# Patient Record
Sex: FEMALE | Race: WHITE | Employment: FULL TIME | URBAN - NONMETROPOLITAN AREA
[De-identification: names, ages, dates, MRNs, and addresses within clinical notes are randomized per-mention and may not be internally consistent; named-entity substitution may affect disease eponyms.]

---

## 2022-04-11 ENCOUNTER — OFFICE VISIT (OUTPATIENT)
Dept: SURGERY | Age: 52
End: 2022-04-11
Payer: COMMERCIAL

## 2022-04-11 VITALS
WEIGHT: 220 LBS | BODY MASS INDEX: 29.8 KG/M2 | DIASTOLIC BLOOD PRESSURE: 82 MMHG | SYSTOLIC BLOOD PRESSURE: 134 MMHG | HEIGHT: 72 IN | TEMPERATURE: 98.3 F | HEART RATE: 79 BPM

## 2022-04-11 DIAGNOSIS — R92.8 ABNORMAL MAMMOGRAM: Primary | ICD-10-CM

## 2022-04-11 PROCEDURE — 99203 OFFICE O/P NEW LOW 30 MIN: CPT | Performed by: PHYSICIAN ASSISTANT

## 2022-04-14 ENCOUNTER — HOSPITAL ENCOUNTER (OUTPATIENT)
Dept: WOMENS IMAGING | Age: 52
Discharge: HOME | End: 2022-04-14
Payer: COMMERCIAL

## 2022-04-14 DIAGNOSIS — N64.89 BREAST ASYMMETRY: ICD-10-CM

## 2022-04-14 DIAGNOSIS — R92.8 ABNORMAL MAMMOGRAM: ICD-10-CM

## 2022-04-14 PROCEDURE — G0279 TOMOSYNTHESIS, MAMMO: HCPCS

## 2022-04-14 PROCEDURE — A4648 IMPLANTABLE TISSUE MARKER: HCPCS

## 2022-04-14 PROCEDURE — 88305 TISSUE EXAM BY PATHOLOGIST: CPT

## 2022-04-14 PROCEDURE — 88361 TUMOR IMMUNOHISTOCHEM/COMPUT: CPT

## 2022-04-15 ENCOUNTER — TELEPHONE (OUTPATIENT)
Dept: SURGERY | Age: 52
End: 2022-04-15

## 2022-04-15 DIAGNOSIS — C50.811 MALIGNANT NEOPLASM OF OVERLAPPING SITES OF RIGHT FEMALE BREAST, UNSPECIFIED ESTROGEN RECEPTOR STATUS (HCC): Primary | ICD-10-CM

## 2022-04-15 RX ORDER — BUSPIRONE HYDROCHLORIDE 5 MG/1
5 TABLET ORAL 3 TIMES DAILY
Qty: 90 TABLET | Refills: 0 | Status: SHIPPED | OUTPATIENT
Start: 2022-04-15 | End: 2022-05-18 | Stop reason: SDUPTHER

## 2022-04-15 NOTE — TELEPHONE ENCOUNTER
Patient left voicemail requesting a return call regarding questions she has for [facility].  She can be reached at [phone]. oj

## 2022-04-15 NOTE — PROGRESS NOTES
Spoke with patient and went over path results. Requested anxiety med be sent to [employer].

## 2022-04-18 PROBLEM — C50.811 MALIGNANT NEOPLASM OF OVERLAPPING SITES OF RIGHT FEMALE BREAST (HCC): Status: ACTIVE | Noted: 2022-04-18

## 2022-04-18 NOTE — TELEPHONE ENCOUNTER
Orders added. Please schedule and breast talk with Dr. Dominique Galeano. Please cancel follow up with me. See if Buspar that I sent in helped her. Thanks.

## 2022-04-18 NOTE — TELEPHONE ENCOUNTER
Patient was informed:    MRI scheduled on 4/25/2022 at 2:30 Pm. All instructions for test were given.   Breast US Scheduled before breast talk at [facility] on 4/27/2022 @ 4 PM. She has a Breast talk with Dr. Yolanda Rivera at 5 PM.

## 2022-04-19 ENCOUNTER — HOSPITAL ENCOUNTER (OUTPATIENT)
Dept: WOMENS IMAGING | Age: 52
Discharge: HOME | End: 2022-04-19
Payer: COMMERCIAL

## 2022-04-19 ENCOUNTER — TELEPHONE (OUTPATIENT)
Dept: OTHER | Age: 52
End: 2022-04-19

## 2022-04-19 DIAGNOSIS — N64.89 BREAST ASYMMETRY: ICD-10-CM

## 2022-04-19 PROCEDURE — 77065 DX MAMMO INCL CAD UNI: CPT

## 2022-04-19 NOTE — TELEPHONE ENCOUNTER
Reached out to patient via telephone call to offer Nurse Navigator services. We spoke at length about navigator services and I offered to mail a Breast Cancer Treatment Handbook to her and she agreed that would be good. I included a business card with contact information, [employer] flyer, [employer] information and a welcome letter. I encouraged her to reach out at anytime with questions or concerns. Appointment times, dates and location reviewed. My chart message sent. Will follow up after breast talk. No questions at this point.

## 2022-04-22 ENCOUNTER — TELEPHONE (OUTPATIENT)
Dept: OTHER | Age: 52
End: 2022-04-22

## 2022-04-22 NOTE — TELEPHONE ENCOUNTER
Pt asking how she can obtain a copy of her pathology report for her cancer policy. Emailed a copy to patient per her request.  Pt informed she may need a letter also but she will contact her company and see what their requirements are. Pt verbalized understanding.

## 2022-04-23 NOTE — PROGRESS NOTES
Subjective:      Patient ID: Evette Murry is a 45 y.o. female. HPI  Ms. Elaina Mortensen presents to establish care for an abnormal mammogram right breast.  The US was normal.  The mammogram demonstrated a \"density noted in CC view. Not identified in the MLO view. \"  The CC view was the only mammogram image sent to us with the [address]. She did not have any spot compression images. Evette Murry is a 45 y.o. female with the following history as recorded in [facility]:  Patient Active Problem List    Diagnosis Date Noted    Malignant neoplasm of overlapping sites of right female breast ([facility] [address] 04/18/2022     Current Outpatient Medications   Medication Sig Dispense Refill    busPIRone (BUSPAR) 5 MG tablet Take 1 tablet by mouth 3 times daily 90 tablet 0     No current facility-administered medications for this visit. Allergies: Monistat [miconazole]  History reviewed. No pertinent past medical history. Past Surgical History:   Procedure Laterality Date    BREAST BIOPSY Left 2008    Benign    US BREAST NEEDLE BIOPSY RIGHT Right 4/14/2022    US BREAST NEEDLE BIOPSY RIGHT 4/14/2022 [facility] [address]     Family History   Problem Relation Age of Onset    Prostate Cancer Father 72     Social History     Tobacco Use    Smoking status: Not on file    Smokeless tobacco: Not on file   Substance Use Topics    Alcohol use: Not on file       Review of Systems   All other systems reviewed and are negative. Objective:   Physical Exam  Constitutional:       Appearance: Normal appearance. Chest:   Breasts:      Right: Normal.      Left: Normal.       Skin:     General: Skin is warm and dry. Neurological:      General: No focal deficit present. Mental Status: She is alert and oriented to person, place, and time. Psychiatric:         Mood and Affect: Mood normal.         Behavior: Behavior normal.         Thought Content:  Thought content normal.         Judgment: Judgment normal.         Assessment:      Abnormal mammogram right breast      Plan:      I explained to her that she needed spot compression mammograms to better characterize the mass. She will be scheduled for these as well as a biopsy to follow if necessary. Naun Capellan PA-C       ADDENDUM 4/14/2022: A diagnostic right mammogram and US was done and the patient subsequently had a US guided biopsy. The report is noted below. EXAMINATION: Diagnostic right mammogram 4/14/2022   HISTORY: Indeterminate outside mammogram with negative outside   ultrasound. FINDINGS: Digital mammography is performed of the right breast in the   CC and MLO projections including 3-dimensional breast tomosynthesis. Magnification views are obtained over calcifications within the right   breast at the 12:00 position as well as within the upper outer   quadrant of the right breast in the anterior breast. Spot compression   views over a focus of irregular nodularity and architectural   distortion are also obtained at the 12:00 position. The breast tissue   is heterogeneously dense somewhat lowering the sensitivity of   mammography consistent with a type C parenchymal pattern. Computer-aided detection was also utilized for assessment of the   mammogram.   There is a focus of irregular nodularity and architectural distortion   within the right breast at the 12:00 position. There are some   associated indeterminate calcifications. Irregular nodularity extends   slightly caudad from this lesion. This was subsequently biopsied under   ultrasound guidance. This is highly suspicious for a primary breast   neoplasm. Also noted are calcifications which I feel are indeterminate within   the more anterior right breast at the 9 to 10:00 position.  Scattered   additional nodules within the right breast may represent fibrocystic   change.       Impression   1.. Focus of irregular nodularity and architectural distortion within   the upper outer quadrant of the right breast 12 to

## 2022-04-25 ENCOUNTER — HOSPITAL ENCOUNTER (OUTPATIENT)
Dept: MRI IMAGING | Age: 52
Discharge: HOME | End: 2022-04-25
Payer: COMMERCIAL

## 2022-04-25 DIAGNOSIS — C50.811 MALIGNANT NEOPLASM OF OVERLAPPING SITES OF RIGHT FEMALE BREAST, UNSPECIFIED ESTROGEN RECEPTOR STATUS (HCC): ICD-10-CM

## 2022-04-25 PROCEDURE — 6360000004 HC RX CONTRAST MEDICATION: Performed by: PHYSICIAN ASSISTANT

## 2022-04-25 PROCEDURE — A9577 INJ MULTIHANCE: HCPCS | Performed by: PHYSICIAN ASSISTANT

## 2022-04-25 PROCEDURE — 77049 MRI BREAST C-+ W/CAD BI: CPT

## 2022-04-25 RX ADMIN — GADOBENATE DIMEGLUMINE 20 ML: 529 INJECTION, SOLUTION INTRAVENOUS at 14:29

## 2022-04-26 NOTE — PROGRESS NOTES
HISTORY OF PRESENT ILLNESS:    Ms. Rey Godinez  is recently status post ultrasound guided breast biopsy  on the right which revealed a 0.5 cm low grade invasive ductal carcinoma. ER is positive at 96%. CO is positive at 97%. Her2 is negative. Ki67 is low at 3%. Mammaprint is low risk Luminal Type A. MRI-4/24/2022  COMPARISON: Ultrasound of the right breast 4/6/2022, diagnostic  mammograms of the right breast 4/14/2022, outside facility screening  digital mammograms 3/29/2022 from [facility]. 3-D MIP images demonstrate moderate diffuse background enhancement. In  the right breast at 12:00, approximately 5.2 cm from the nipple there  is a spiculated mass that represents known biopsy-proven malignancy,  this measures approximately 1.3 x 0.9 x 2.3 cm, demonstrating mixed  kinetics, the worst curve is rapid, washout (malignant-type kinetics). There is patchy enhancement around this mass likely reactive related  to recent biopsy. T2 images demonstrate a Hydro clip within the mass. T2 images also demonstrate numerous cysts throughout both breasts,  which range in size from the smallest 1 to 2 mm to as large as 7 mm. No internal mammary or axillary lymphadenopathy is identified. There  appears to be a small intramammary lymph node at 10:00 in the right  breast, measuring approximately 7 x 4 mm. A fatty hilum is visible. No  contralateral left breast mass or suspicious enhancement is  identified. No skin thickening or nipple retraction is identified. Incidentally noted are multiple benign-appearing cysts within the  liver. IMPRESSION:  Abnormal MRI of the right breast, with a spiculated mass  and biopsy proven malignancy demonstrated at 12:00, measuring  approximately 1.3 x 0.9 x 2.3 cm, approximately 5.2 cm from the  nipple. There is mild reactive enhancement around the mass likely  related to the biopsy. No evidence of internal mammary or axillary  lymphadenopathy.  No additional mass or abnormal enhancement is  identified within either breast. BI-RADS Category 6, known malignancy. Definitive treatment is recommended. Signed by Dr Onur John. Joey    DISCUSSION:  I had a lengthy discussion with Ms.  Stevenson Flynn  and her family about the ramifications of the diagnosis of breast cancer. We discussed the pathophysiology of cancer in general and also the ways in which surgery, radiation therapy, and chemotherapy are utilized in the treatment of different types of cancers. We also explained how these modalities related to her situation in particular. We discussed the pathophysiology of breast cancer and some length, including what is known about the causes of breast cancer, its relationship to fibrocystic disease, its relationship to hormone replacement therapy, and some of the genetic aspects involved in familial breast cancers. We discussed the BrCa genetic analysis and why it is appropriate for her. We discussed breast MRI and how it assists in evaluation of breast cancers and the results of her MRI if done. We discussed the surgical options including simple mastectomy and lumpectomy with  sentinel lymph node biopsy as well as the possibility of axillary lymph node dissection. We explained in depth why breast conservation therapy requires radiation treatments for the majority of women. These treatments may be external beam for 6 weeks, partial breast for 5 days,  or intraoperative. I explained that most women treated for invasive malignancy do receive systemic therapy, hormonal therapy or  chemotherapy postoperatively depending upon the final pathology, the lymph node status, and the hormone receptor status. I discussed Oncotype Dx, Mammoprint, and Adjuvant Online as tools which aid in the decision for chemotherapy or hormonal therapy. We also discussed the possibility of breast reconstruction if a mastectomy was required.   .  I explained to her the different techniques including placement of a subpectoral implant with a Alloderm sling  versus TRAM flap reconstruction as welll as other methods of reconstruction. She does not wish to pursue reconstruction at this time. After a prolonged discussion lasting 90 minutes  we felt it was most appropriate that she undergo right lumpectomy, and sentinel node biopsy, with preop ultrasound and and intraop ultrasound guided needle localization with IORT    I started her on Singulair and tamoxifen today      We discussed the risks and benefits of the surgery including but not limited to bleeding, infection, pain, lymphedema, numbness, and also the risks of general anesthesia including pneumonia, blood clots, heart attack, stroke, and death. She expresses good understanding and is agreeable to proceed with surgery.       We will schedule this to be done when convenient  at [facility].

## 2022-04-27 ENCOUNTER — INITIAL CONSULT (OUTPATIENT)
Dept: SURGERY | Age: 52
End: 2022-04-27
Payer: COMMERCIAL

## 2022-04-27 ENCOUNTER — HOSPITAL ENCOUNTER (OUTPATIENT)
Dept: WOMENS IMAGING | Age: 52
Discharge: HOME | End: 2022-04-27

## 2022-04-27 DIAGNOSIS — C50.811 MALIGNANT NEOPLASM OF OVERLAPPING SITES OF RIGHT BREAST IN FEMALE, ESTROGEN RECEPTOR POSITIVE (HCC): Primary | ICD-10-CM

## 2022-04-27 DIAGNOSIS — C50.811 MALIGNANT NEOPLASM OF OVERLAPPING SITES OF RIGHT FEMALE BREAST, UNSPECIFIED ESTROGEN RECEPTOR STATUS (HCC): ICD-10-CM

## 2022-04-27 DIAGNOSIS — Z17.0 MALIGNANT NEOPLASM OF OVERLAPPING SITES OF RIGHT BREAST IN FEMALE, ESTROGEN RECEPTOR POSITIVE (HCC): Primary | ICD-10-CM

## 2022-04-27 PROCEDURE — 99215 OFFICE O/P EST HI 40 MIN: CPT | Performed by: SURGERY

## 2022-04-27 PROCEDURE — 99417 PROLNG OP E/M EACH 15 MIN: CPT | Performed by: SURGERY

## 2022-04-27 NOTE — Clinical Note
Right lumpectomy and sentinel node with IORT May 12 Preop ultrasound intraoperative ultrasound-guided needle localization Pec block, BioSorb, flaps, margin probe Day before surgery to office for marking, PT, etc.

## 2022-04-28 DIAGNOSIS — C50.811 MALIGNANT NEOPLASM OF OVERLAPPING SITES OF RIGHT BREAST IN FEMALE, ESTROGEN RECEPTOR POSITIVE (HCC): Primary | ICD-10-CM

## 2022-04-28 DIAGNOSIS — Z17.0 MALIGNANT NEOPLASM OF OVERLAPPING SITES OF RIGHT BREAST IN FEMALE, ESTROGEN RECEPTOR POSITIVE (HCC): Primary | ICD-10-CM

## 2022-05-02 ENCOUNTER — HOSPITAL ENCOUNTER (OUTPATIENT)
Dept: PREADMISSION TESTING | Age: 52
Discharge: HOME | End: 2022-05-06
Payer: COMMERCIAL

## 2022-05-02 VITALS — WEIGHT: 215 LBS | HEIGHT: 72 IN | BODY MASS INDEX: 29.12 KG/M2

## 2022-05-02 LAB
BASOPHILS ABSOLUTE: 0 K/UL (ref 0–0.2)
BASOPHILS RELATIVE PERCENT: 0.8 % (ref 0–1)
EKG P AXIS: 52 DEGREES
EKG P-R INTERVAL: 124 MS
EKG Q-T INTERVAL: 362 MS
EKG QRS DURATION: 74 MS
EKG QTC CALCULATION (BAZETT): 400 MS
EKG T AXIS: 26 DEGREES
EOSINOPHILS ABSOLUTE: 0.2 K/UL (ref 0–0.6)
EOSINOPHILS RELATIVE PERCENT: 5.6 % (ref 0–5)
HCT VFR BLD CALC: 41.7 % (ref 37–47)
HEMOGLOBIN: 13.4 G/DL (ref 12–16)
IMMATURE GRANULOCYTES #: 0 K/UL
LYMPHOCYTES ABSOLUTE: 1.3 K/UL (ref 1.1–4.5)
LYMPHOCYTES RELATIVE PERCENT: 35.9 % (ref 20–40)
MCH RBC QN AUTO: 30.4 PG (ref 27–31)
MCHC RBC AUTO-ENTMCNC: 32.1 G/DL (ref 33–37)
MCV RBC AUTO: 94.6 FL (ref 81–99)
MONOCYTES ABSOLUTE: 0.5 K/UL (ref 0–0.9)
MONOCYTES RELATIVE PERCENT: 12.6 % (ref 0–10)
NEUTROPHILS ABSOLUTE: 1.6 K/UL (ref 1.5–7.5)
NEUTROPHILS RELATIVE PERCENT: 44.8 % (ref 50–65)
PDW BLD-RTO: 12.9 % (ref 11.5–14.5)
PLATELET # BLD: 245 K/UL (ref 130–400)
PMV BLD AUTO: 10.4 FL (ref 9.4–12.3)
RBC # BLD: 4.41 M/UL (ref 4.2–5.4)
WBC # BLD: 3.6 K/UL (ref 4.8–10.8)

## 2022-05-02 PROCEDURE — 93005 ELECTROCARDIOGRAM TRACING: CPT | Performed by: SURGERY

## 2022-05-02 PROCEDURE — 93010 ELECTROCARDIOGRAM REPORT: CPT | Performed by: INTERNAL MEDICINE

## 2022-05-02 PROCEDURE — 85025 COMPLETE CBC W/AUTO DIFF WBC: CPT

## 2022-05-02 RX ORDER — MONTELUKAST SODIUM 10 MG/1
10 TABLET ORAL NIGHTLY
COMMUNITY

## 2022-05-02 RX ORDER — TAMOXIFEN CITRATE 10 MG/1
20 TABLET ORAL NIGHTLY
COMMUNITY
End: 2022-06-07

## 2022-05-02 RX ORDER — GABAPENTIN 300 MG/1
300 CAPSULE ORAL ONCE
Status: CANCELLED | OUTPATIENT
Start: 2022-05-12

## 2022-05-02 RX ORDER — ACETAMINOPHEN 325 MG/1
975 TABLET ORAL ONCE
Status: CANCELLED | OUTPATIENT
Start: 2022-05-12

## 2022-05-06 ENCOUNTER — HOSPITAL ENCOUNTER (OUTPATIENT)
Dept: OCCUPATIONAL THERAPY | Age: 52
Setting detail: THERAPIES SERIES
Discharge: HOME | End: 2022-05-06
Payer: COMMERCIAL

## 2022-05-06 VITALS — BODY MASS INDEX: 28.58 KG/M2 | HEIGHT: 72 IN | WEIGHT: 211 LBS

## 2022-05-06 PROCEDURE — 97165 OT EVAL LOW COMPLEX 30 MIN: CPT

## 2022-05-06 NOTE — PROGRESS NOTES
Occupational Therapy: Initial Evaluation   Patient: Sheryle Luster (36 y.o. female)   Examination Date:   Plan of Care Certification Period: 2022 to  2022      :  1970  MRN: [id number]  CSN: [id number]   Insurance: Payor: Diora Shells / Plan: Lylia Shells - [place] PPO / Product Type: *No Product type* /   Insurance ID: [id number] - ([facility]) Secondary Insurance (if applicable): Insurance Information: [facility]   Referring Physician: MD Spencer Dunham MD   PCP: No primary care provider on file. Visits to Date/Visits Approved:   /      No Show/Cancelled Appts:    /       Medical Diagnosis: Malignant neoplasm of overlapping sites of right female breast [C50.811]  Estrogen receptor positive status (ER+) [Z17.0] C50.811 Malignant neoplasm of overlapping sites of right breast in female, estrogen receptor positive  No data recorded     [facility]   Patient assessed for rehabilitation services?: Yes  Self reported health status[de-identified] Excellent    Medical History: Chart Reviewed: Yes   Past Medical History:   Diagnosis Date    Breast lump      Surgical History:   Past Surgical History:   Procedure Laterality Date    BREAST BIOPSY Left     Benign    US BREAST NEEDLE BIOPSY RIGHT Right 2022    US BREAST NEEDLE BIOPSY RIGHT 2022 [facility] [address]     History obtained from[de-identified] [address] Review,      Family/Caregiver Present: No    Subjective History: Onset Date: 22       Lymphedema History (if Applicable):  Oncology History (if Applicable):   History of cellulitis: No  Family history of lymphedema: No  Previous lymphedema treament: No History of Cancer and/or Treatment: Yes  Medical Oncologist: Jace Andre MD  Date of first cancer diagnosis: 22  Cancer Type/Stage: Malignant neoplasm of overlapping of right breast- lumpectomy scheduled for 22               Learning/Language: Learning  Does the patient/guardian have any barriers to learning?: No barriers     Pain Screening    Pain Screening  Patient Currently in Pain: No    Functional Status    Dominant Hand: : Right    OBJECTIVE EXAMINATION       Review of Systems / Circulatory Assessment:  Follows Commands: Within Functional Limits  Height and Weight  Height: 6' (182.9 cm)  Weight: 211 lb (95.7 kg)  BSA (Calculated - sq m): 2.2 sq meters  BMI (Calculated): 28.7    Measurements: Circumferential Limb Measurements   Limb Measurements Assessed: UE  Left Measurements Right Measurements   Left UE Circumferential Measurements (cm)   Patient Position: Seated  Dominent Hand: Right  Height: 6' (182.9 cm)  Weight: 211 lb (95.7 kg)  BMI (Calculated): 28.61  Reference for Starting Point: 11 cm proximally from distal 3rd digit  0 (Starting Measurement): 18.8  (+ 4 cm): 22.4  (+8 cm): 17.4  (+12 cm): 18.7  (+16 cm): 20.1  (+20 cm): 22.5  (+24 cm): 25.3  (+28 cm): 27.2  (+32 cm): 28.6  (+36 cm): 30.5  (+40 cm): 32.3  (+44 cm): 33.5  (+48 cm): 35.5  (+52 cm): 37.5  (+56 cm): 38.8  Total Girth - Left UE: 409.1  Left LE Circumferential Measurements (cm)   Height: 6' (182.9 cm)  Weight: 211 lb (95.7 kg)  BMI (Calculated): 28.61 Right UE Circumferential Measurements (cm)   Patient Position: Seated  Dominent Hand: Right  Height: 6' (182.9 cm)  Weight: 211 lb (95.7 kg)  BMI (Calculated): 28.61  Reference for Starting Point: 11 cm proximally from distal 3rd digit  0 (Starting Measurement): 18.8  (+ 4 cm): 21.5  (+8 cm): 17.3  (+12 cm): 17.9  (+16 cm): 20.3  (+20 cm): 23  (+24 cm): 26  (+28 cm): 27.8  (+32 cm): 28.6  (+36 cm): 30.6  (+40 cm): 32.8  (+44 cm): 33.5  (+48 cm): 35.4  (+52 cm): 37.6  (+56 cm): 39  Total Girth - Right UE: 445.5  Right LE Circumferential Measurements (cm)   Height: 6' (182.9 cm)  Weight: 211 lb (95.7 kg)  BMI (Calculated): 28.61   Circumferential Limb Measurements Summary  Involved Limb : R UE  Uninvolved Limb : L UE  Total Girth - Ininvolved Limb (cm): 445.5  Total Girth - Uninvolved Limb (cm): 409.1  Girth Difference (cm): 36.4  Percentage Difference (%): 8.9    ASSESSMENT     Impression: Assessment: Patient participated in sozo evaluation this date for lymphedema monitoring in [facility]. Patient in lymphedema clinic this date to establishment L-dex and BUE circumference measurement baselines. Patient scheduled for 5/12/22 R lumpectomy. Patient recently s/p ultrasound guided breast biopsy on the right which revealed a low grade invasive ductal carcinoma. Patient educated on purpose and benefits of sozo monitoring program, anatomy/physiology of lymphedema, and signs/symptoms. Patient agreeable to monitoring program for 1 year with 3 month follow ups. GOALS     Patient Goal(s): Patient goals : No goals identified due to evaluation only. TREATMENT PLAN       REQUIRES OT FOLLOW-UP: Yes  Plan Comment: Evaluation only. 3 month follow up to be scheduled. Eval Complexity:   Decision Making: Low Complexity  Occupational Profile/History : Low  Exam: L-dex and BUE circumference measurements    Therapy Time  Individual Time In: 1346  Individual Time Out: 1407  Minutes: 21  Timed Code Treatment Minutes: 21 Minutes     Therapist Signature: Mary Price OT Electronically signed by VIKTORIA Mahajan, OTJONY/L, CLT on 5/6/2022 at 4:10 PM   Date: 1/7/1464     I certify that the above Occupational Therapy Services are being furnished while the patient is under my care. I agree with the treatment plan and certify that this therapy is necessary. Physician's Signature:  ___________________________   Date:_______                                                                   Yohana Owens MD        Physician Comments: _______________________________________________    Please sign and return to [facility] OCCUPATIONAL THERAPY. Please fax to the location listed below.  [place]illa for this referral!    [address] [facility] OCCUPATIONAL THERAPY  [address] [place] [address] [address] [address]  Dept: [address] [place] [address] [place]: [phone]       POC NOTE

## 2022-05-11 ENCOUNTER — HOSPITAL ENCOUNTER (OUTPATIENT)
Dept: WOMENS IMAGING | Age: 52
Discharge: HOME | End: 2022-05-11
Payer: COMMERCIAL

## 2022-05-11 DIAGNOSIS — C50.811 MALIGNANT NEOPLASM OF OVERLAPPING SITES OF RIGHT FEMALE BREAST, UNSPECIFIED ESTROGEN RECEPTOR STATUS (HCC): ICD-10-CM

## 2022-05-11 PROCEDURE — 76642 ULTRASOUND BREAST LIMITED: CPT

## 2022-05-12 ENCOUNTER — HOSPITAL ENCOUNTER (OUTPATIENT)
Dept: NUCLEAR MEDICINE | Age: 52
Discharge: HOME | End: 2022-05-14
Attending: SURGERY
Payer: COMMERCIAL

## 2022-05-12 ENCOUNTER — HOSPITAL ENCOUNTER (OUTPATIENT)
Age: 52
Setting detail: OUTPATIENT SURGERY
Discharge: HOME | End: 2022-05-12
Attending: SURGERY | Admitting: SURGERY
Payer: COMMERCIAL

## 2022-05-12 ENCOUNTER — ANESTHESIA (OUTPATIENT)
Dept: OPERATING ROOM | Age: 52
End: 2022-05-12
Payer: COMMERCIAL

## 2022-05-12 ENCOUNTER — ANESTHESIA EVENT (OUTPATIENT)
Dept: OPERATING ROOM | Age: 52
End: 2022-05-12
Payer: COMMERCIAL

## 2022-05-12 ENCOUNTER — APPOINTMENT (OUTPATIENT)
Dept: WOMENS IMAGING | Age: 52
End: 2022-05-12
Attending: SURGERY
Payer: COMMERCIAL

## 2022-05-12 VITALS — DIASTOLIC BLOOD PRESSURE: 52 MMHG | SYSTOLIC BLOOD PRESSURE: 111 MMHG | TEMPERATURE: 98 F | OXYGEN SATURATION: 95 %

## 2022-05-12 VITALS
TEMPERATURE: 97.8 F | OXYGEN SATURATION: 98 % | BODY MASS INDEX: 29.12 KG/M2 | WEIGHT: 215 LBS | DIASTOLIC BLOOD PRESSURE: 83 MMHG | HEART RATE: 91 BPM | RESPIRATION RATE: 16 BRPM | SYSTOLIC BLOOD PRESSURE: 147 MMHG | HEIGHT: 72 IN

## 2022-05-12 DIAGNOSIS — C50.811 MALIGNANT NEOPLASM OF OVERLAPPING SITES OF RIGHT FEMALE BREAST, UNSPECIFIED ESTROGEN RECEPTOR STATUS (HCC): ICD-10-CM

## 2022-05-12 DIAGNOSIS — C50.811 MALIGNANT NEOPLASM OF OVERLAPPING SITES OF RIGHT BREAST IN FEMALE, ESTROGEN RECEPTOR POSITIVE (HCC): ICD-10-CM

## 2022-05-12 DIAGNOSIS — Z17.0 MALIGNANT NEOPLASM OF OVERLAPPING SITES OF RIGHT BREAST IN FEMALE, ESTROGEN RECEPTOR POSITIVE (HCC): ICD-10-CM

## 2022-05-12 LAB — HCG(URINE) PREGNANCY TEST: NEGATIVE

## 2022-05-12 PROCEDURE — 19301 PARTIAL MASTECTOMY: CPT | Performed by: PHYSICIAN ASSISTANT

## 2022-05-12 PROCEDURE — A4217 STERILE WATER/SALINE, 500 ML: HCPCS | Performed by: SURGERY

## 2022-05-12 PROCEDURE — C1713 ANCHOR/SCREW BN/BN,TIS/BN: HCPCS | Performed by: SURGERY

## 2022-05-12 PROCEDURE — 7100000001 HC PACU RECOVERY - ADDTL 15 MIN: Performed by: SURGERY

## 2022-05-12 PROCEDURE — 19297 PLACE BREAST CATH FOR RAD: CPT | Performed by: PHYSICIAN ASSISTANT

## 2022-05-12 PROCEDURE — 38792 RA TRACER ID OF SENTINL NODE: CPT

## 2022-05-12 PROCEDURE — 38900 IO MAP OF SENT LYMPH NODE: CPT | Performed by: SURGERY

## 2022-05-12 PROCEDURE — 2500000003 HC RX 250 WO HCPCS

## 2022-05-12 PROCEDURE — 3600000004 HC SURGERY LEVEL 4 BASE: Performed by: SURGERY

## 2022-05-12 PROCEDURE — 3600000014 HC SURGERY LEVEL 4 ADDTL 15MIN: Performed by: SURGERY

## 2022-05-12 PROCEDURE — C1819 TISSUE LOCALIZATION-EXCISION: HCPCS

## 2022-05-12 PROCEDURE — 6360000002 HC RX W HCPCS: Performed by: ANESTHESIOLOGY

## 2022-05-12 PROCEDURE — 84703 CHORIONIC GONADOTROPIN ASSAY: CPT

## 2022-05-12 PROCEDURE — C1728 CATH, BRACHYTX SEED ADM: HCPCS | Performed by: SURGERY

## 2022-05-12 PROCEDURE — 19297 PLACE BREAST CATH FOR RAD: CPT | Performed by: SURGERY

## 2022-05-12 PROCEDURE — 7100000010 HC PHASE II RECOVERY - FIRST 15 MIN: Performed by: SURGERY

## 2022-05-12 PROCEDURE — A4216 STERILE WATER/SALINE, 10 ML: HCPCS | Performed by: ANESTHESIOLOGY

## 2022-05-12 PROCEDURE — 6360000002 HC RX W HCPCS: Performed by: SURGERY

## 2022-05-12 PROCEDURE — 7100000011 HC PHASE II RECOVERY - ADDTL 15 MIN: Performed by: SURGERY

## 2022-05-12 PROCEDURE — 2709999900 HC NON-CHARGEABLE SUPPLY: Performed by: SURGERY

## 2022-05-12 PROCEDURE — 2500000003 HC RX 250 WO HCPCS: Performed by: SURGERY

## 2022-05-12 PROCEDURE — A4648 IMPLANTABLE TISSUE MARKER: HCPCS | Performed by: SURGERY

## 2022-05-12 PROCEDURE — 38525 BIOPSY/REMOVAL LYMPH NODES: CPT | Performed by: PHYSICIAN ASSISTANT

## 2022-05-12 PROCEDURE — 6370000000 HC RX 637 (ALT 250 FOR IP): Performed by: SURGERY

## 2022-05-12 PROCEDURE — 2500000003 HC RX 250 WO HCPCS: Performed by: ANESTHESIOLOGY

## 2022-05-12 PROCEDURE — 88307 TISSUE EXAM BY PATHOLOGIST: CPT

## 2022-05-12 PROCEDURE — 6360000002 HC RX W HCPCS

## 2022-05-12 PROCEDURE — 19294 PREPJ TUM CAV IORT PRTL MAST: CPT | Performed by: PHYSICIAN ASSISTANT

## 2022-05-12 PROCEDURE — 88305 TISSUE EXAM BY PATHOLOGIST: CPT

## 2022-05-12 PROCEDURE — 19294 PREPJ TUM CAV IORT PRTL MAST: CPT | Performed by: SURGERY

## 2022-05-12 PROCEDURE — 2580000003 HC RX 258: Performed by: SURGERY

## 2022-05-12 PROCEDURE — A9520 TC99 TILMANOCEPT DIAG 0.5MCI: HCPCS | Performed by: SURGERY

## 2022-05-12 PROCEDURE — 76998 US GUIDE INTRAOP: CPT | Performed by: SURGERY

## 2022-05-12 PROCEDURE — 19340 INSJ BREAST IMPLT SM D MAST: CPT | Performed by: SURGERY

## 2022-05-12 PROCEDURE — 2580000003 HC RX 258: Performed by: ANESTHESIOLOGY

## 2022-05-12 PROCEDURE — 19301 PARTIAL MASTECTOMY: CPT | Performed by: SURGERY

## 2022-05-12 PROCEDURE — 3700000000 HC ANESTHESIA ATTENDED CARE: Performed by: SURGERY

## 2022-05-12 PROCEDURE — 3430000000 HC RX DIAGNOSTIC RADIOPHARMACEUTICAL: Performed by: SURGERY

## 2022-05-12 PROCEDURE — 7100000000 HC PACU RECOVERY - FIRST 15 MIN: Performed by: SURGERY

## 2022-05-12 PROCEDURE — 38525 BIOPSY/REMOVAL LYMPH NODES: CPT | Performed by: SURGERY

## 2022-05-12 PROCEDURE — 77424 IO RAD TX DELIVERY BY X-RAY: CPT | Performed by: SURGERY

## 2022-05-12 PROCEDURE — 3700000001 HC ADD 15 MINUTES (ANESTHESIA): Performed by: SURGERY

## 2022-05-12 PROCEDURE — 38900 IO MAP OF SENT LYMPH NODE: CPT | Performed by: PHYSICIAN ASSISTANT

## 2022-05-12 PROCEDURE — 0546T RF SPECTRSC NTRAOP MRGN ASMT: CPT | Performed by: SURGERY

## 2022-05-12 DEVICE — THE MARKER IS A RADIOGRAPHIC IMPLANTABLE MARKER USED TO MARK SOFT TISSUE.IT IS COMPRISED OF A BIOABSORBABLE SPACER THAT HOLDS RADIOPAQUE MARKER CLIPS.
Type: IMPLANTABLE DEVICE | Site: BREAST | Status: FUNCTIONAL
Brand: BIOZORB MARKER

## 2022-05-12 RX ORDER — SODIUM CHLORIDE, SODIUM LACTATE, POTASSIUM CHLORIDE, CALCIUM CHLORIDE 600; 310; 30; 20 MG/100ML; MG/100ML; MG/100ML; MG/100ML
INJECTION, SOLUTION INTRAVENOUS CONTINUOUS
Status: DISCONTINUED | OUTPATIENT
Start: 2022-05-12 | End: 2022-05-12 | Stop reason: HOSPADM

## 2022-05-12 RX ORDER — HYDROMORPHONE HYDROCHLORIDE 1 MG/ML
0.5 INJECTION, SOLUTION INTRAMUSCULAR; INTRAVENOUS; SUBCUTANEOUS EVERY 5 MIN PRN
Status: DISCONTINUED | OUTPATIENT
Start: 2022-05-12 | End: 2022-05-12 | Stop reason: HOSPADM

## 2022-05-12 RX ORDER — MIDAZOLAM HYDROCHLORIDE 1 MG/ML
INJECTION INTRAMUSCULAR; INTRAVENOUS PRN
Status: DISCONTINUED | OUTPATIENT
Start: 2022-05-12 | End: 2022-05-12 | Stop reason: SDUPTHER

## 2022-05-12 RX ORDER — ONDANSETRON 2 MG/ML
INJECTION INTRAMUSCULAR; INTRAVENOUS PRN
Status: DISCONTINUED | OUTPATIENT
Start: 2022-05-12 | End: 2022-05-12 | Stop reason: SDUPTHER

## 2022-05-12 RX ORDER — HYDROMORPHONE HYDROCHLORIDE 1 MG/ML
0.25 INJECTION, SOLUTION INTRAMUSCULAR; INTRAVENOUS; SUBCUTANEOUS EVERY 5 MIN PRN
Status: DISCONTINUED | OUTPATIENT
Start: 2022-05-12 | End: 2022-05-12 | Stop reason: HOSPADM

## 2022-05-12 RX ORDER — DIPHENHYDRAMINE HYDROCHLORIDE 50 MG/ML
INJECTION INTRAMUSCULAR; INTRAVENOUS PRN
Status: DISCONTINUED | OUTPATIENT
Start: 2022-05-12 | End: 2022-05-12 | Stop reason: SDUPTHER

## 2022-05-12 RX ORDER — ONDANSETRON 2 MG/ML
4 INJECTION INTRAMUSCULAR; INTRAVENOUS
Status: DISCONTINUED | OUTPATIENT
Start: 2022-05-12 | End: 2022-05-12 | Stop reason: HOSPADM

## 2022-05-12 RX ORDER — LIDOCAINE HYDROCHLORIDE 10 MG/ML
INJECTION, SOLUTION EPIDURAL; INFILTRATION; INTRACAUDAL; PERINEURAL PRN
Status: DISCONTINUED | OUTPATIENT
Start: 2022-05-12 | End: 2022-05-12 | Stop reason: SDUPTHER

## 2022-05-12 RX ORDER — ISOSULFAN BLUE 50 MG/5ML
INJECTION, SOLUTION SUBCUTANEOUS PRN
Status: DISCONTINUED | OUTPATIENT
Start: 2022-05-12 | End: 2022-05-12 | Stop reason: ALTCHOICE

## 2022-05-12 RX ORDER — ACETAMINOPHEN 325 MG/1
975 TABLET ORAL ONCE
Status: COMPLETED | OUTPATIENT
Start: 2022-05-12 | End: 2022-05-12

## 2022-05-12 RX ORDER — FAMOTIDINE 10 MG/ML
INJECTION, SOLUTION INTRAVENOUS PRN
Status: DISCONTINUED | OUTPATIENT
Start: 2022-05-12 | End: 2022-05-12 | Stop reason: SDUPTHER

## 2022-05-12 RX ORDER — HYDROCODONE BITARTRATE AND ACETAMINOPHEN 5; 325 MG/1; MG/1
1 TABLET ORAL EVERY 6 HOURS PRN
Qty: 12 TABLET | Refills: 0 | Status: SHIPPED | OUTPATIENT
Start: 2022-05-12 | End: 2023-05-12

## 2022-05-12 RX ORDER — DEXAMETHASONE SODIUM PHOSPHATE 4 MG/ML
4 INJECTION, SOLUTION INTRA-ARTICULAR; INTRALESIONAL; INTRAMUSCULAR; INTRAVENOUS; SOFT TISSUE ONCE
Status: COMPLETED | OUTPATIENT
Start: 2022-05-12 | End: 2022-05-12

## 2022-05-12 RX ORDER — FENTANYL CITRATE 50 UG/ML
INJECTION, SOLUTION INTRAMUSCULAR; INTRAVENOUS PRN
Status: DISCONTINUED | OUTPATIENT
Start: 2022-05-12 | End: 2022-05-12 | Stop reason: SDUPTHER

## 2022-05-12 RX ORDER — PROPOFOL 10 MG/ML
INJECTION, EMULSION INTRAVENOUS CONTINUOUS PRN
Status: DISCONTINUED | OUTPATIENT
Start: 2022-05-12 | End: 2022-05-12 | Stop reason: SDUPTHER

## 2022-05-12 RX ORDER — GABAPENTIN 300 MG/1
300 CAPSULE ORAL ONCE
Status: COMPLETED | OUTPATIENT
Start: 2022-05-12 | End: 2022-05-12

## 2022-05-12 RX ORDER — CEPHALEXIN 500 MG/1
500 CAPSULE ORAL 3 TIMES DAILY
Qty: 30 CAPSULE | Refills: 0 | Status: SHIPPED | OUTPATIENT
Start: 2022-05-12

## 2022-05-12 RX ADMIN — PROPOFOL 140 MCG/KG/MIN: 10 INJECTION, EMULSION INTRAVENOUS at 11:55

## 2022-05-12 RX ADMIN — FAMOTIDINE 20 MG: 10 INJECTION, SOLUTION INTRAVENOUS at 12:02

## 2022-05-12 RX ADMIN — FENTANYL CITRATE 50 MCG: 50 INJECTION, SOLUTION INTRAMUSCULAR; INTRAVENOUS at 14:10

## 2022-05-12 RX ADMIN — GABAPENTIN 300 MG: 300 CAPSULE ORAL at 10:38

## 2022-05-12 RX ADMIN — Medication 2000 MG: at 11:57

## 2022-05-12 RX ADMIN — FENTANYL CITRATE 50 MCG: 50 INJECTION, SOLUTION INTRAMUSCULAR; INTRAVENOUS at 13:39

## 2022-05-12 RX ADMIN — LIDOCAINE HYDROCHLORIDE 50 MG: 10 INJECTION, SOLUTION EPIDURAL; INFILTRATION; INTRACAUDAL; PERINEURAL at 11:55

## 2022-05-12 RX ADMIN — SODIUM CHLORIDE, SODIUM LACTATE, POTASSIUM CHLORIDE, AND CALCIUM CHLORIDE: 600; 310; 30; 20 INJECTION, SOLUTION INTRAVENOUS at 10:39

## 2022-05-12 RX ADMIN — ACETAMINOPHEN 975 MG: 325 TABLET ORAL at 10:38

## 2022-05-12 RX ADMIN — TILMANOCEPT 0.5 MILLICURIE: KIT at 12:11

## 2022-05-12 RX ADMIN — FAMOTIDINE 20 MG: 10 INJECTION, SOLUTION INTRAVENOUS at 11:08

## 2022-05-12 RX ADMIN — FENTANYL CITRATE 50 MCG: 50 INJECTION, SOLUTION INTRAMUSCULAR; INTRAVENOUS at 11:55

## 2022-05-12 RX ADMIN — DIPHENHYDRAMINE HYDROCHLORIDE 25 MG: 50 INJECTION, SOLUTION INTRAMUSCULAR; INTRAVENOUS at 12:02

## 2022-05-12 RX ADMIN — ONDANSETRON 4 MG: 2 INJECTION INTRAMUSCULAR; INTRAVENOUS at 11:58

## 2022-05-12 RX ADMIN — MIDAZOLAM 2 MG: 1 INJECTION INTRAMUSCULAR; INTRAVENOUS at 11:46

## 2022-05-12 RX ADMIN — FENTANYL CITRATE 50 MCG: 50 INJECTION, SOLUTION INTRAMUSCULAR; INTRAVENOUS at 12:23

## 2022-05-12 RX ADMIN — SODIUM CHLORIDE, SODIUM LACTATE, POTASSIUM CHLORIDE, AND CALCIUM CHLORIDE: 600; 310; 30; 20 INJECTION, SOLUTION INTRAVENOUS at 13:33

## 2022-05-12 RX ADMIN — DEXAMETHASONE SODIUM PHOSPHATE 4 MG: 4 INJECTION, SOLUTION INTRAMUSCULAR; INTRAVENOUS at 11:09

## 2022-05-12 ASSESSMENT — LIFESTYLE VARIABLES: SMOKING_STATUS: 0

## 2022-05-12 ASSESSMENT — PAIN SCALES - GENERAL: PAINLEVEL_OUTOF10: 0

## 2022-05-12 ASSESSMENT — PAIN - FUNCTIONAL ASSESSMENT: PAIN_FUNCTIONAL_ASSESSMENT: 0-10

## 2022-05-12 NOTE — OP NOTE
saline. The breast tissue and overlying skin were approximated using suture material. Ultrasound examination showed a skin bridge of 1.3 cm. The miniature x-ray tube (with a measured length of 25.05 cm) was inserted into the central channel of the balloon applicator (with a measured depth of 25.0 cm) and a pull back test of the source was successfully performed. A thin, flexible lead shield was placed on the breast overlying the implant site. Rolling, leaded-glass shields were brought into the operating room and the room was evacuated except for the anesthesiologist, medical physicist and radiation oncologist. Radiation precaution signs were placed on the operating room entrances. The source was activated and a dose of 2000 cGy was delivered to the balloon surface in 804.0 seconds. Treatment started 05/12/2022 at 01:10:33 PM and was completed 05/12/2022 at 01:25:48 PM.    Following the treatment, the x-ray source was withdrawn and placed in a shielded chamber in the Xoft controller unit. The flexible lead shield was removed from the patient. Removal of the balloon applicator was performed. The volume of saline removed from the balloon applicator was verified. Closing of the lumpectomy and axillary incisions was performed by Dr. Mohamud Woody and also the subject of a separate dictation.     Physics support, including machine calibration and , was provided by Virgen Castanon PhD.     William Jauregui MD        Electronically signed by Sree Santo MD on 0/72/8130 at 2:03 PM

## 2022-05-12 NOTE — H&P
HISTORY OF PRESENT ILLNESS:     Ms. Latasha Henriquez  is recently status post ultrasound guided breast biopsy  on the right which revealed a 0.5 cm low grade invasive ductal carcinoma. ER is positive at 96%. NM is positive at 97%. Her2 is negative. Ki67 is low at 3%.      Mammaprint is low risk Luminal Type A.     MRI-4/24/2022  COMPARISON: Ultrasound of the right breast 4/6/2022, diagnostic  mammograms of the right breast 4/14/2022, outside facility screening  digital mammograms 3/29/2022 from [facility]. 3-D MIP images demonstrate moderate diffuse background enhancement. In  the right breast at 12:00, approximately 5.2 cm from the nipple there  is a spiculated mass that represents known biopsy-proven malignancy,  this measures approximately 1.3 x 0.9 x 2.3 cm, demonstrating mixed  kinetics, the worst curve is rapid, washout (malignant-type kinetics). There is patchy enhancement around this mass likely reactive related  to recent biopsy. T2 images demonstrate a Hydro clip within the mass. T2 images also demonstrate numerous cysts throughout both breasts,  which range in size from the smallest 1 to 2 mm to as large as 7 mm. No internal mammary or axillary lymphadenopathy is identified. There  appears to be a small intramammary lymph node at 10:00 in the right  breast, measuring approximately 7 x 4 mm. A fatty hilum is visible. No  contralateral left breast mass or suspicious enhancement is  identified. No skin thickening or nipple retraction is identified. Incidentally noted are multiple benign-appearing cysts within the  liver. IMPRESSION:  Abnormal MRI of the right breast, with a spiculated mass  and biopsy proven malignancy demonstrated at 12:00, measuring  approximately 1.3 x 0.9 x 2.3 cm, approximately 5.2 cm from the  nipple. There is mild reactive enhancement around the mass likely  related to the biopsy. No evidence of internal mammary or axillary  lymphadenopathy.  No additional mass or abnormal enhancement is  identified within either breast. BI-RADS Category 6, known malignancy. Definitive treatment is recommended. Signed by Dr Urbano Scales. Amanda Luna is a 45 y.o. female with the following history as recorded in [facility]:  Patient Active Problem List    Diagnosis Date Noted    Malignant neoplasm of overlapping sites of right female breast ([address] 04/18/2022     Current Facility-Administered Medications   Medication Dose Route Frequency Provider Last Rate Last Admin    lactated ringers infusion   IntraVENous Continuous Gamal Castelan  mL/hr at 05/12/22 1039 New Bag at 05/12/22 1039     Allergies: Monistat [miconazole] and Naproxen  Past Medical History:   Diagnosis Date    Breast lump      Past Surgical History:   Procedure Laterality Date    BREAST BIOPSY Left 2008    Benign    US BREAST NEEDLE BIOPSY RIGHT Right 4/14/2022    US BREAST NEEDLE BIOPSY RIGHT 4/14/2022 [facility] [address]     Family History   Problem Relation Age of Onset    Prostate Cancer Father 72     Social History     Tobacco Use    Smoking status: Former Smoker    Smokeless tobacco: Never Used   Substance Use Topics    Alcohol use: Yes     Comment: occ. ROS:  14 point review of systems is negative except for the above. PHYSICAL EXAM:    The patient is a 45 y.o. female  in no acute distress. She is alert oriented and cooperative. Mood and affect are appropriate. Skin is warm and dry without rashes. .VS    HEENT: Normocephalic and atraumatic. EOMs intact. Pupils equal and round and reactive to light and accommodation. External ears and nose are normal.  Sclera nonicteric. Conjunctiva normal  Oropharynx without masses or lesions. Neck: Neck is supple without masses or thyromegaly    Chest: Lungs are clear to auscultation. Respiratory effort normal    Cardiac: Regular rate and rhythm without rubs, murmurs, or gallops    Breasts:  The breasts are symmetrical. There are fibrocystic changes throughout both breasts. There are no dominant masses, no skin or nipple changes, and no axillary adenopathy. Abdomen: The abdomen is soft and nontender with no hepatosplenomegaly. There are no abdominal hernias noted. Extremities: The extremities are normal. There are no signs of clubbing, cyanosis, or edema. IMPRESSION: Right breast cancer    DISCUSSION:  I had a lengthy discussion with Ms. Ocampo  and her family about the ramifications of the diagnosis of breast cancer. We discussed the pathophysiology of cancer in general and also the ways in which surgery, radiation therapy, and chemotherapy are utilized in the treatment of different types of cancers. We also explained how these modalities related to her situation in particular. We discussed the pathophysiology of breast cancer and some length, including what is known about the causes of breast cancer, its relationship to fibrocystic disease, its relationship to hormone replacement therapy, and some of the genetic aspects involved in familial breast cancers. We discussed the BrCa genetic analysis and why it is appropriate for her. We discussed breast MRI and how it assists in evaluation of breast cancers and the results of her MRI if done.       We discussed the surgical options including simple mastectomy and lumpectomy with  sentinel lymph node biopsy as well as the possibility of axillary lymph node dissection. We explained in depth why breast conservation therapy requires radiation treatments for the majority of women. These treatments may be external beam for 6 weeks, partial breast for 5 days,  or intraoperative. I explained that most women treated for invasive malignancy do receive systemic therapy, hormonal therapy or  chemotherapy postoperatively depending upon the final pathology, the lymph node status, and the hormone receptor status.   I discussed Oncotype Dx, Mammoprint, and Adjuvant Online as tools which aid in the decision for chemotherapy or hormonal therapy. We also discussed the possibility of breast reconstruction if a mastectomy was required. .  I explained to her the different techniques including placement of a subpectoral implant with a Alloderm sling  versus TRAM flap reconstruction as welll as other methods of reconstruction. She does not wish to pursue reconstruction at this time.         After a prolonged discussion lasting 90 minutes  we felt it was most appropriate that she undergo right lumpectomy, and sentinel node biopsy, with preop ultrasound and and intraop ultrasound guided needle localization with IORT     I started her on Singulair and tamoxifen today       We discussed the risks and benefits of the surgery including but not limited to bleeding, infection, pain, lymphedema, numbness, and also the risks of general anesthesia including pneumonia, blood clots, heart attack, stroke, and death.   She expresses good understanding and is agreeable to proceed with surgery.       We will schedule this to be done when convenient  at [facility].

## 2022-05-12 NOTE — ANESTHESIA PRE PROCEDURE
Department of Anesthesiology  Preprocedure Note       Name:  Tomy Sloan   Age:  45 y.o.  :  1970                                          MRN:  [id number]         Date:  2022      Surgeon: Sam Gomez):  MD Joss Lorenz MD    Procedure: Procedure(s):  RIGHT LUMPECTOMY AND SN, PREOP US,INTRAOP US GUIDED NL, PEC BLOCK, BIOZORB, FLAPS, MARGIN PROBE, IORT    Medications prior to admission:   Prior to Admission medications    Medication Sig Start Date End Date Taking? Authorizing Provider   montelukast (SINGULAIR) 10 MG tablet Take 10 mg by mouth nightly    Historical Provider, MD   tamoxifen (NOLVADEX) 10 MG tablet Take 20 mg by mouth nightly    Historical Provider, MD   mupirocin (BACTROBAN NASAL) 2 % nasal ointment Apply to each nostril BID 5 days prior to surgery 22   Hetal Garcia MD   busPIRone (BUSPAR) 5 MG tablet Take 1 tablet by mouth 3 times daily 4/15/22 5/15/22  Evette Elizalde PA-C       Current medications:    Current Facility-Administered Medications   Medication Dose Route Frequency Provider Last Rate Last Admin    lactated ringers infusion   IntraVENous Continuous Ghislaine Chavez  mL/hr at 22 1039 New Bag at 22 1039    ceFAZolin (ANCEF) 2000 mg in 0.9% sodium chloride 50 mL IVPB  2,000 mg IntraVENous On Call to [address] [place], MD           Allergies:     Allergies   Allergen Reactions    Monistat [Miconazole] Other (See Comments)     Extreme burning sensation    Naproxen Rash     Facial rash and nausea       Problem List:    Patient Active Problem List   Diagnosis Code    Malignant neoplasm of overlapping sites of right female breast ([facility] [address] C50.811       Past Medical History:        Diagnosis Date    Breast lump        Past Surgical History:        Procedure Laterality Date    BREAST BIOPSY Left     Benign    US BREAST NEEDLE BIOPSY RIGHT Right 2022    US BREAST NEEDLE BIOPSY RIGHT 2022 [address] History:    Social History     Tobacco Use    Smoking status: Former Smoker    Smokeless tobacco: Never Used   Substance Use Topics    Alcohol use: Yes     Comment: occ. Counseling given: Not Answered      Vital Signs (Current):   Vitals:    05/12/22 1027   BP: (!) 159/86   Pulse: 90   Resp: 16   Temp: 98.3 °F (36.8 °C)   TempSrc: Tympanic   SpO2: 99%   Weight: 215 lb (97.5 kg)   Height: 6' (1.829 m)                                              BP Readings from Last 3 Encounters:   05/12/22 (!) 159/86   04/11/22 134/82       NPO Status: Time of last liquid consumption: 0000                        Time of last solid consumption: 0000                        Date of last liquid consumption: 05/11/22                        Date of last solid food consumption: 05/11/22    BMI:   Wt Readings from Last 3 Encounters:   05/12/22 215 lb (97.5 kg)   05/06/22 211 lb (95.7 kg)   05/02/22 215 lb (97.5 kg)     Body mass index is 29.16 kg/m². CBC:   Lab Results   Component Value Date    WBC 3.6 05/02/2022    RBC 4.41 05/02/2022    HGB 13.4 05/02/2022    HCT 41.7 05/02/2022    MCV 94.6 05/02/2022    RDW 12.9 05/02/2022     05/02/2022       CMP: No results found for: NA, K, CL, CO2, BUN, CREATININE, GFRAA, AGRATIO, LABGLOM, GLUCOSE, GLU, PROT, CALCIUM, BILITOT, ALKPHOS, AST, ALT    POC Tests: No results for input(s): POCGLU, POCNA, POCK, POCCL, POCBUN, POCHEMO, POCHCT in the last 72 hours.     Coags: No results found for: PROTIME, INR, APTT    HCG (If Applicable): No results found for: PREGTESTUR, PREGSERUM, HCG, HCGQUANT     ABGs: No results found for: PHART, PO2ART, SFC2QEZ, PRT0JOS, BEART, [id number]     Type & Screen (If Applicable):  No results found for: LABABO, LABRH    Drug/Infectious Status (If Applicable):  No results found for: HIV, HEPCAB    COVID-19 Screening (If Applicable): No results found for: COVID19        Anesthesia Evaluation  Patient summary reviewed no history of anesthetic complications:   Airway: Mallampati: I  TM distance: >3 FB   Neck ROM: full  Mouth opening: > = 3 FB Dental: normal exam         Pulmonary:normal exam  breath sounds clear to auscultation      (-) asthma, recent URI, sleep apnea and not a current smoker          Patient did not smoke on day of surgery. Cardiovascular:  Exercise tolerance: good (>4 METS),       (-) pacemaker, hypertension, past MI, CABG/stent and  angina    ECG reviewed  Rhythm: regular  Rate: normal           Beta Blocker:  Not on Beta Blocker         Neuro/Psych:      (-) seizures, TIA and CVA           GI/Hepatic/Renal:        (-) GERD, liver disease and no renal disease       Endo/Other:    (+) malignancy/cancer (Breast). (-) diabetes mellitus, hypothyroidism, hyperthyroidism               Abdominal:             Vascular:     - DVT. Other Findings:           Anesthesia Plan      MAC     ASA 3     (Preop famotidine, dexamethasone  Discussed conversion to GETA if necessary)  Induction: intravenous. MIPS: Postoperative opioids intended and Prophylactic antiemetics administered. Anesthetic plan and risks discussed with patient and child/children. Use of blood products discussed with patient and child/children whom consented to blood products. Plan discussed with CRNA.     Attending anesthesiologist reviewed and agrees with Pre Eval content              Wiley Jain MD   5/12/2022

## 2022-05-12 NOTE — CONSULTS
Radiation Oncology Consult Note    Requesting MD:  Yousuf Fuchs MD Admit Status:         History obtained from:   [x] Patient  [x] Other (specify): chart    CC: Infiltrating ductal carcinoma of right breast    HPI: Ms. Mayela Garrison is a 45 y.o. female with a history of abnormal screening mammogram.  Ultrasound of the right breast 4/14/2022 showed an ill-defined mass at the 12 o'clock position measuring 10 x 12 x 8 mm. Ultrasound-guided biopsy of the right breast performed 4/14/2022 showing infiltrating ductal carcinoma low-grade, measuring 0.5 cm in maximum dimension. Various treatment options have been discussed with the patient with Dr. Martin Crawford and she has elected to proceed with right breast ectomy with right axillary sentinel lymph node biopsy and consideration of intraoperative radiation therapy utilizing the [facility] electronic brachytherapy system. Patient aspects of her treatment have been discussed with the patient and her daughter and the patient gives informed consent to proceed. Past Medical History:   Diagnosis Date    Breast lump      Menstrual history: The patient is G1, P1, Ab0. Menarche was at age 14. Patient is premenopausal.  Has experienced occasional hot flashes with night sweats. The patient not receive hormone replacement therapy.     Past Surgical History:   Procedure Laterality Date    BREAST BIOPSY Left 2008    Benign    US BREAST NEEDLE BIOPSY RIGHT Right 4/14/2022    US BREAST NEEDLE BIOPSY RIGHT 4/14/2022 [facility] [address]       Family History   Problem Relation Age of Onset    Prostate Cancer Father 72       Social History     Socioeconomic History    Marital status:      Spouse name: Not on file    Number of children: Not on file    Years of education: Not on file    Highest education level: Not on file   Occupational History    Not on file   Tobacco Use    Smoking status: Former Smoker    Smokeless tobacco: Never Used   Substance and Sexual Activity    Alcohol use: Yes     Comment: occ.  Drug use: Not on file    Sexual activity: Not on file   Other Topics Concern    Not on file   Social History Narrative    Not on file     Social Determinants of Health     Financial Resource Strain:     Difficulty of Paying Living Expenses: Not on file   Food Insecurity:     Worried About Running Out of Food in the Last Year: Not on file    [place] of Food in the Last Year: Not on file   Transportation Needs:     Lack of Transportation (Medical): Not on file    Lack of Transportation (Non-Medical):  Not on file   Physical Activity:     Days of Exercise per Week: Not on file    Minutes of Exercise per Session: Not on file   Stress:     Feeling of Stress : Not on file   Social Connections:     Frequency of Communication with Friends and Family: Not on file    Frequency of Social Gatherings with Friends and Family: Not on file    Attends [employer] Services: Not on file    Active Member of [address] [place] [address] or Organizations: Not on file    Attends Club or Organization Meetings: Not on file    Marital Status: Not on file   Intimate Partner Violence:     Fear of Current or Ex-Partner: Not on file    Emotionally Abused: Not on file    Physically Abused: Not on file    Sexually Abused: Not on file   Housing Stability:     Unable to Pay for Housing in the Last Year: Not on file    Number of [place] in the Last Year: Not on file    Unstable Housing in the Last Year: Not on file       Allergies   Allergen Reactions    Monistat [Miconazole] Other (See Comments)     Extreme burning sensation    Naproxen Rash     Facial rash and nausea       CURRENT MEDICATIONS:   ceFAZolin  2,000 mg IntraVENous On Call to OR       ROS:  Negative    PE:  Vitals:    05/12/22 1027   BP: (!) 159/86   Pulse: 90   Resp: 16   Temp: 98.3 °F (36.8 °C)   SpO2: 99%     No intake or output data in the 24 hours ending 05/12/22 1128  Estimated body mass index is 29.16 kg/m² as calculated from the following:    Height as of this encounter: 6' (1.829 m). Weight as of this encounter: 215 lb (97.5 kg). General Appearance:    Alert, oriented, cooperative, no distress, appears stated age   Head:    Normocephalic, without obvious abnormality, atraumatic   Eyes:    PERRL, conjunctiva/corneas clear, EOM's intact                    Neck:   Supple, symmetrical, trachea midline, no adenopathy. Back:     Symmetric, no curvature, ROM normal, no CVA tenderness   Lungs:     Clear to auscultation and percussion, respirations unlabored   Chest wall:    No tenderness or deformity   Heart:    Breasts:    Regular rate and rhythm, S1 and S2 normal; no murmur, rub   or gallop    Deferred until examination under anesthesia prior to planned   lumpectomy and axillary sentinel lymph node biopsy. Abdomen:     Soft, non-tender, no masses, no organomegaly           Extremities:   Extremities normal, atraumatic, no clubbing, cyanosis or   edema       Skin:   Skin color, texture, turgor normal, no rashes or lesions               LAB RESULTS:  Recent Results (from the past 24 hour(s))   Pregnancy, Urine    Collection Time: 05/12/22 10:20 AM   Result Value Ref Range    HCG(Urine) Pregnancy Test Negative        IMAGING:  MRI BREAST BILATERAL W WO CONTRAST    Result Date: 4/25/2022  EXAMINATION: MRI BREAST BILATERAL W WO CONTRAST 4/25/2022 5:15 PM HISTORY: 69-year-old female with recently diagnosed right breast carcinoma status post biopsy on 4/14/2022. Report: Multiplanar MR imaging of the breasts was performed with and without intravenous gadolinium, 20 mL of intravenous MultiHance was measured without incident. COMPARISON: Ultrasound of the right breast 4/6/2022, diagnostic mammograms of the right breast 4/14/2022, outside facility screening digital mammograms 3/29/2022 from [facility]. 3-D MIP images demonstrate moderate diffuse background enhancement.  In the right breast at 12:00, approximately 5.2 cm from the nipple there is a spiculated mass that represents known biopsy-proven malignancy, this measures approximately 1.3 x 0.9 x 2.3 cm, demonstrating mixed kinetics, the worst curve is rapid, washout (malignant-type kinetics). There is patchy enhancement around this mass likely reactive related to recent biopsy. T2 images demonstrate a Hydro clip within the mass. T2 images also demonstrate numerous cysts throughout both breasts, which range in size from the smallest 1 to 2 mm to as large as 7 mm. No internal mammary or axillary lymphadenopathy is identified. There appears to be a small intramammary lymph node at 10:00 in the right breast, measuring approximately 7 x 4 mm. A fatty hilum is visible. No contralateral left breast mass or suspicious enhancement is identified. No skin thickening or nipple retraction is identified. Incidentally noted are multiple benign-appearing cysts within the liver. Abnormal MRI of the right breast, with a spiculated mass and biopsy proven malignancy demonstrated at 12:00, measuring approximately 1.3 x 0.9 x 2.3 cm, approximately 5.2 cm from the nipple. There is mild reactive enhancement around the mass likely related to the biopsy. No evidence of internal mammary or axillary lymphadenopathy. No additional mass or abnormal enhancement is identified within either breast. BI-RADS Category 6, known malignancy. Definitive treatment is recommended. Note: A negative contrast-enhanced breast MRI has a high sensitivity for detecting and excluding invasive ductal carcinoma 5mm and larger; however, appropriate clinical and mammographic follow-up are always recommended. Breast MRI may not detect some cases of DCIS or less angiogenic neoplasms, therefore it should not be used to \"exclude\" DCIS or lobular neoplasia. If there are suspicious calcifications or worrisome palpable masses, then biopsy should still be considered based on clinical assessment. Signed by Dr Amado Gee.  Lauren Mcguire BREAST LIMITED RIGHT    Result Date: 5/11/2022  EXAMINATION: US BREAST LIMITED RIGHT 5/11/2022 4:52 PM HISTORY: Marking the right breast 12:00 position in the right breast biopsy clip is present. The skin is marked at this location Signed by Dr Layal Tsang    MAMMOGRAM POST BX CLIP PLACEMENT RIGHT    Result Date: 4/19/2022  EXAMINATION: Right breast postbiopsy clip mammogram 4/14/2022 FINDINGS: The patient's a 57-year-old female who underwent ultrasound-guided biopsy for a suspicious lesion within the right breast at the 12:00 position. The clip is appropriately positioned adjacent to the lesion and architectural distortion. This lesion is sufficiently suspicious that if benign biopsy results are acquired that repeat imaging and repeat biopsy would be suggested. There are no complications. Signed by Dr Gail Krishnamurthy    Addendum Date: 5/4/2022    ADDENDUM: The mammographic and ultrasound findings are in concordance with the pathologic findings. Diagnosis is invasive breast cancer. Surgical consultation recommended. Signed by Dr Riki Perry    Result Date: 5/4/2022  ** ORIGINAL REPORT ** EXAMINATION: Ultrasound-guided breast biopsy 4/14/2022 FINDINGS: An area of architectural distortion and irregular nodularity is noted within the right breast at the 12:00 position on diagnostic mammography. Subsequent ultrasound demonstrates a highly suspicious lesion with ill-defined margins and posterior acoustic shadowing within the right breast 12:00 position 4 cm from the nipple. This measures approximately 10 x 12 x 8 mm in size. There is significant posterior acoustic shadowing associated with this lesion. Biopsy was requested. The risks and benefits of the procedure were discussed with the patient. Signed consent form was on the chart at the time of the procedure. Sonography is performed over the lesion in the transverse and longitudinal projections.  An adequate site for the entrance of the mammotome needle was selected. Following appropriate prepping and draping of the skin and placement of both superficial and deep anesthetic with 1% lidocaine and 1% lidocaine with epinephrine I successfully advanced the 11-gauge mammotome through a small skin incision into the breast with the sampling port immediately posterior to the lesion. A single pass was obtained with vacuum assistance. The patient was still having some discomfort at the biopsy site so additional lidocaine with epinephrine was injected. Subsequently I obtained an additional 3 vacuum assisted core specimens. These all appear to be of good quality. I then placed a Hydromark marker at the biopsy site. There were no immediate complications. 1.. A total of 4 vacuum assisted biopsies obtained from a suspicious lesion within the right breast at the 12:00 position. There were no immediate complications. A Hydromark marker was left in place and is well-positioned on post biopsy mammogram. There were no immediate complications. Signed by Dr Willi Bradshaw** ADDENDUM #1 **    VALENTIN DIGITAL DIAGNOSTIC UNILATERAL RIGHT    Result Date: 4/14/2022  EXAMINATION: Diagnostic right mammogram 4/14/2022 HISTORY: Indeterminate outside mammogram with negative outside ultrasound. FINDINGS: Digital mammography is performed of the right breast in the CC and MLO projections including 3-dimensional breast tomosynthesis. Magnification views are obtained over calcifications within the right breast at the 12:00 position as well as within the upper outer quadrant of the right breast in the anterior breast. Spot compression views over a focus of irregular nodularity and architectural distortion are also obtained at the 12:00 position. The breast tissue is heterogeneously dense somewhat lowering the sensitivity of mammography consistent with a type C parenchymal pattern.  Computer-aided detection was also utilized for assessment of the mammogram. There is a focus of irregular nodularity and architectural distortion within the right breast at the 12:00 position. There are some associated indeterminate calcifications. Irregular nodularity extends slightly caudad from this lesion. This was subsequently biopsied under ultrasound guidance. This is highly suspicious for a primary breast neoplasm. Also noted are calcifications which I feel are indeterminate within the more anterior right breast at the 9 to 10:00 position. Scattered additional nodules within the right breast may represent fibrocystic change. 1.. Focus of irregular nodularity and architectural distortion within the upper outer quadrant of the right breast 12 to 1:00 position mid depth. This was surgically biopsied under ultrasound guidance and is highly suspicious for a primary breast neoplasm based on both mammographic imaging and ultrasound. There are associated indeterminate calcifications associated with this lesion. An additional focus of indeterminate calcifications are noted more anteriorly within the upper outer quadrant of the right breast at the 9 to 10:00 position. 2. ACR BI-RADS Category 5-highly suspicious for primary breast neoplasm. Biopsy has been performed under ultrasound guidance. Signed by Dr Willis Gil:  Stage: IA (T1a, N0, M0)    After full discussion of treatment options, the patient has elected to proceed with right breast lumpectomy and right axillary sentinel lymph node biopsy. This will be followed by consideration of intraoperative radiation therapy using the [facility] electronic brachytherapy system.        Roe Fair MD    6/48/6808 11:28 AM

## 2022-05-12 NOTE — ANESTHESIA POSTPROCEDURE EVALUATION
Department of Anesthesiology  Postprocedure Note    Patient: Alondra Atwood  MRN: [id number]  YOB: 1970  Date of evaluation: 5/12/2022  Time:  2:18 PM     Procedure Summary     Date: 05/12/22 Room / Location: [address]    Anesthesia Start: 9935 Anesthesia Stop: 8145    Procedure: RIGHT LUMPECTOMY AND SN, PREOP US,INTRAOP US GUIDED NL, PEC BLOCK, BIOZORB, FLAPS, MARGIN PROBE, IORT (Right Breast) Diagnosis:       Malignant neoplasm of overlapping sites of right female breast, unspecified estrogen receptor status [facility]      (C50.811)    Surgeons: Lizz Garcia MD Responsible Provider: MATILDE Meléndez CRNA    Anesthesia Type: MAC ASA Status: 3          Anesthesia Type: No value filed. Jose Phase I: Jose Score: 5    Jose Phase II:      Last vitals: Reviewed and per EMR flowsheets.        Anesthesia Post Evaluation    Patient location during evaluation: PACU  Patient participation: waiting for patient participation  Level of consciousness: sleepy but conscious  Pain score: 0  Airway patency: patent  Nausea & Vomiting: no nausea and no vomiting  Complications: no  Cardiovascular status: hemodynamically stable and blood pressure returned to baseline  Respiratory status: acceptable and face mask  Hydration status: stable

## 2022-05-12 NOTE — BRIEF OP NOTE
Brief Postoperative Note      DATE OF PROCEDURE: 5/12/2022     SURGEON: Lisandra Renteria MD    PREOPERATIVE DIAGNOSIS:  C50.811    POSTOPERATIVE DIAGNOSIS: Same     OPERATION: Procedure(s):  RIGHT LUMPECTOMY AND SN, PREOP US,INTRAOP US GUIDED NL, PEC BLOCK, BIOZORB, FLAPS, MARGIN PROBE, IORT    ANESTHESIA: General    ESTIMATED BLOOD LOSS: Minimal    COMPLICATIONS: None. SPECIMENS:   ID Type Source Tests Collected by Time Destination   A : right breast tissue Tissue Breast SURGICAL PATHOLOGY Lisandra Renteria MD 5/12/2022 1235    B : right breast tissue-additional caudal margins Tissue Breast SURGICAL PATHOLOGY Lisandra Renteria MD 5/12/2022 1245    C : right breast tissue-additional medial margins Tissue Breast SURGICAL PATHOLOGY Lisandra Renteria MD 5/12/2022 1245    D : right breast tissue-additional lateral margins Tissue Breast SURGICAL PATHOLOGY Lisandra Renteria MD 5/12/2022 1245    E : right breast tissue-additional deep margins Tissue Breast SURGICAL PATHOLOGY Lisandra Renteria MD 5/12/2022 1245        DRAINS: REBECCA    The patient tolerated the procedure well.     Electronically signed by Lisandra Renteria MD  on 5/12/2022 at 2:02 PM

## 2022-05-13 NOTE — OP NOTE
[facility] [address], [facility]                                OPERATIVE REPORT    PATIENT NAME: Marcela Espinoza                  :        1970  MED REC NO:   [id number]                              ROOM:  ACCOUNT NO:   [de-identified]                           ADMIT DATE: 2022  PROVIDER:     Eliu Sales MD    DATE OF PROCEDURE:  2022    PREOPERATIVE DIAGNOSIS:  Right breast cancer. POSTOPERATIVE DIAGNOSIS:  Right breast cancer. PROCEDURES:  1. Injection of radionuclide. 2.  Lymphatic mapping. 3.  Ultrasound-guided needle localization. 4.  Placement of needle. 5.  Right pectoral block - 5 levels. 6.  Right partial mastectomy. 7.  Right sentinel lymph node biopsy. 8.  Utilization of MarginProbe intraoperative margin assessment device. 9.  Preparation of cavity for IORT. 10.  Placement of IORT cavity. 11.  Placement of three-dimensional implant for tissue filling, for  tumor site marking and additional radiation targeting if required. 12. Soft tissue transfer flaps greater than 30 cm2. This was an  oncoplastic closure with the primary defect measuring 5 x 9 cm and the  secondary defect measuring 10 x 12 cm for a total of 165 cm2. SURGEON:  Eliu Sales MD    ASSISTANT:  Sreekanth Pichardo PA-C. He was present for all portions of  the case including all critical portions as well as closure. ANESTHESIA:  Pec block with MAC. INDICATIONS:  The patient is a 58-year-old lady recently diagnosed with  a favorable right breast cancer. We discussed risks and benefits of  lumpectomy, sentinel node biopsy and intraoperative radiation. She  understands and is agreeable. OPERATIVE PROCEDURE:  Today, she was brought to the operating room,  adequately sedated and then we injected 500 microcuries of Lymphoseek in  the right periareolar dermis.   We then used ultrasound to identify the  previously placed clip in the residual tumor and inserted a 5 cm Kopans  wire directly into this. We then prepped the skin with chlorhexidine  and proceeded with our pectoral block. We utilized a solution of 50 mL  of 0.2% ropivacaine, 50 mL of saline and 8 mg of Decadron. We began  injecting the intercostals at the lateral sternal border with ultrasound  guidance. We did 5 levels. Once this was accomplished, we injected the  inframammary crease and the infraclavicular area. We then went lateral  and injected the interpectoral space using ultrasound guidance and then  laterally injected the intercostals at the level of the serratus again  with ultrasound guidance and did 5 levels. Once this was accomplished,  we re-prepped and re-draped, made a curvilinear oncoplastic-type  incision in the upper breast, dissected down the subcutaneous and then  elevated the skin and soft tissue flaps using Bovie electrocautery and  the facelift scissors. This was approximately 7 to 8 cm in all  directions. Once this was done, we were clearly able to identify the  previously placed clip as well as the wire. We excised an ellipse of  breast tissue oriented towards the nipple. We then placed cranial,  caudal, medial and lateral markers on the specimen with it in situ, then  completed our excision of the specimen and placed a deep marker. We  then did specimen ultrasound which showed excellent margins in all  directions. I was quite pleased with this. We then utilized the  intraoperative margin assessment device, the MarginProbe, and it was  negative on the cranial, negative on the anterior, positive on the deep,  medial, lateral and caudal.  These were all re-excised. We had also  previously injected 3 mL of isosulfan blue in the right biceps crease to  facilitate axillary reverse mapping. We utilized the [employer] and  entered the right axilla and identified a single area of hot lymph node  tissue at about 1400 counts.   We excised it, it was not grossly  abnormal.  We did not remove any blue nodes. We then irrigated  copiously obtaining good hemostasis, turned our attention back to the  breast defect. We sized the cavity using a 24-Portuguese Ly balloon and  60 mL appeared to be the proper size. We obtained the appropriate  catheter, filled it with 60 mL of saline, checked for symmetry, which  was good. The balloon was intact. We then placed a 2-0 Prolene  pursestring around the cavity and inserted the balloon and tied down our  pursestring. We then measured skin to balloon distance and it was  greater than 2 cm except for the superior which was about 1.6 cm. We  then packed all this with antibiotic-soaked gauzes. I reinspected with  Pathology. It appeared we had grossly excellent margins and she  underwent 15 minutes of uncomplicated intraoperative radiation therapy. We then withdrew the catheter, took out our pursestring, irrigated  copiously, placed a 3-dimensional implant into the tumor bed marking the  site for future additional radiation if required as well as tissue  filling and tumor site marking. We then rotated the breast parenchymal  flaps into the wound. The primary defect measured 5 x 9 cm, the  secondary defect measured 10 x 12 cm. We placed a large Cali-Donahue  drain under this and then closed with 2-0 and 3-0 Vicryl and 4-0  Stratafix. Prineo dressing was applied. Estimated blood loss, minimal.  Complications, none. She tolerated this quite well.         Olman Concepcion MD    D: 05/12/2022 15:43:57      T: 05/12/2022 21:52:27     [email or web address]  Job#: [id number]     Doc#: [id number]    CC:

## 2022-05-18 ENCOUNTER — OFFICE VISIT (OUTPATIENT)
Dept: SURGERY | Age: 52
End: 2022-05-18

## 2022-05-18 VITALS — SYSTOLIC BLOOD PRESSURE: 118 MMHG | HEART RATE: 80 BPM | DIASTOLIC BLOOD PRESSURE: 78 MMHG

## 2022-05-18 DIAGNOSIS — Z17.0 MALIGNANT NEOPLASM OF OVERLAPPING SITES OF RIGHT BREAST IN FEMALE, ESTROGEN RECEPTOR POSITIVE (HCC): Primary | ICD-10-CM

## 2022-05-18 DIAGNOSIS — F41.9 ANXIETY: ICD-10-CM

## 2022-05-18 DIAGNOSIS — C50.811 MALIGNANT NEOPLASM OF OVERLAPPING SITES OF RIGHT BREAST IN FEMALE, ESTROGEN RECEPTOR POSITIVE (HCC): Primary | ICD-10-CM

## 2022-05-18 PROCEDURE — 99024 POSTOP FOLLOW-UP VISIT: CPT | Performed by: PHYSICIAN ASSISTANT

## 2022-05-18 RX ORDER — BUSPIRONE HYDROCHLORIDE 5 MG/1
5 TABLET ORAL 3 TIMES DAILY
Qty: 90 TABLET | Refills: 0 | Status: SHIPPED | OUTPATIENT
Start: 2022-05-18 | End: 2022-06-16 | Stop reason: SDUPTHER

## 2022-05-18 NOTE — LETTER
[facility]  [address]  Phone: [phone]  Fax: [phone]          May 18, 2022     Patient: Aquilino Velez   YOB: 1970   Date of Visit: 5/18/2022       To Whom It May Concern: It is my medical opinion that Maddie Sayres 5/23/22 without restrictions. If you have any questions or concerns, please don't hesitate to call.     Sincerely,        Jillian Galeana PA-C

## 2022-05-25 ENCOUNTER — OFFICE VISIT (OUTPATIENT)
Dept: SURGERY | Age: 52
End: 2022-05-25

## 2022-05-25 VITALS — HEART RATE: 76 BPM | DIASTOLIC BLOOD PRESSURE: 80 MMHG | SYSTOLIC BLOOD PRESSURE: 124 MMHG

## 2022-05-25 DIAGNOSIS — C50.811 MALIGNANT NEOPLASM OF OVERLAPPING SITES OF RIGHT BREAST IN FEMALE, ESTROGEN RECEPTOR POSITIVE (HCC): Primary | ICD-10-CM

## 2022-05-25 DIAGNOSIS — Z17.0 MALIGNANT NEOPLASM OF OVERLAPPING SITES OF RIGHT BREAST IN FEMALE, ESTROGEN RECEPTOR POSITIVE (HCC): Primary | ICD-10-CM

## 2022-05-25 PROCEDURE — 99024 POSTOP FOLLOW-UP VISIT: CPT | Performed by: PHYSICIAN ASSISTANT

## 2022-05-25 NOTE — PROGRESS NOTES
Subjective  Chelle Mclean comes today for fluid check. She is status post right lumpectomy with sentinel lymph node biopsy and intraoperative radiation therapy. She has had no new surgical complaints. Objective  Patient Active Problem List    Diagnosis Date Noted    Malignant neoplasm of overlapping sites of right female breast ([facility] [address] 04/18/2022       Current Outpatient Medications   Medication Sig Dispense Refill    busPIRone (BUSPAR) 5 MG tablet Take 1 tablet by mouth 3 times daily 90 tablet 0    HYDROcodone-acetaminophen (NORCO) 5-325 MG per tablet Take 1 tablet by mouth every 6 hours as needed for Pain. 12 tablet 0    cephALEXin (KEFLEX) 500 MG capsule Take 1 capsule by mouth 3 times daily 30 capsule 0    montelukast (SINGULAIR) 10 MG tablet Take 10 mg by mouth nightly      tamoxifen (NOLVADEX) 10 MG tablet Take 20 mg by mouth nightly      mupirocin (BACTROBAN NASAL) 2 % nasal ointment Apply to each nostril BID 5 days prior to surgery 1 each 0     No current facility-administered medications for this visit. Allergies: Monistat [miconazole] and Naproxen    Past Medical History:   Diagnosis Date    Breast lump        Past Surgical History:   Procedure Laterality Date    BREAST BIOPSY Left 2008    Benign    BREAST LUMPECTOMY Right 5/12/2022    RIGHT LUMPECTOMY AND SN, PREOP US,INTRAOP US GUIDED NL, PEC BLOCK, BIOZORB, FLAPS, MARGIN PROBE, IORT performed by Alexsander Silverman MD at [place] [id number] Right 4/14/2022    US BREAST NEEDLE BIOPSY RIGHT 4/14/2022 [facility] [address]       Family History   Problem Relation Age of Onset    Prostate Cancer Father 72       Social History     Tobacco Use    Smoking status: Former Smoker    Smokeless tobacco: Never Used   Substance Use Topics    Alcohol use: Yes     Comment: occ. Review of systems  Reviewed and positive for the above all other systems noted to be negative    Exam  Blood pressure 124/80, pulse 76. Examination to her right breast incision, the incision is healing well. There is no evidence of seroma. She has some postoperative ecchymosis as expected. She has a palpable BioZorb as expected. Assessment  Doing well status post right lumpectomy, sentinel node biopsy and Intra-Op radiation therapy      Plan  She has an appointment to see Dr. Rubin Norman back in about 3 weeks. She has an appointment to see her medical oncologist.  I have discussed range of motion exercises with her. She will call us if she has any changes.

## 2022-05-25 NOTE — PROGRESS NOTES
measures 0.6 cm in greatest dimension. 3.  Infiltrating carcinoma extends to within 0.7 cm of the nearest   lateral surgical excision margin. 4.  Low-grade ductal carcinoma in situ is identified in conjunction with   the invasive lesion. 5.  In situ carcinoma is located 0.7 cm from the nearest lateral surgical   excision margin. B.  Breast, excision of additional right breast caudal margin: Benign   breast parenchyma with changes consistent with fibrocystic mastopathy. C.  Breast, excision of additional right breast medial margin: Benign   breast parenchyma with changes consistent with fibrocystic mastopathy and   mild ductal hyperplasia of the usual type. D.  Breast, excision of additional right breast deep margin: Benign   breast with changes consistent with fibrocystic mastopathy. E.  Breast, excision of additional right breast lateral margin: Benign   breast with changes consistent with fibrocystic mastopathy. F.  Lymph node, right sentinel lymph node biopsy: 3 lymph nodes, negative   for evidence of malignancy. AJCC STAGE: pT1b, (sn)pN0, pMx   Review of systems  Reviewed and positive for the above all other systems noted to be negative    Exam  Blood pressure 118/78, pulse 80. On examination to her right breast, she has ecchymosis as expected. The incision is healing well. There is no evidence of infection. Cali-Donahue drainage is clear. Assessment  Doing well status post right lumpectomy with sentinel lymph node biopsy and intraoperative radiation therapy    Plan  We have removed her Cali-Donahue drain today. I would like to see her back in the office in 1 week for fluid accumulation. We will make her an appointment to see Dr. Earnest Aguilar.

## 2022-06-06 DIAGNOSIS — C50.811 MALIGNANT NEOPLASM OF OVERLAPPING SITES OF RIGHT FEMALE BREAST, UNSPECIFIED ESTROGEN RECEPTOR STATUS (HCC): Primary | ICD-10-CM

## 2022-06-06 NOTE — PROGRESS NOTES
MEDICAL ONCOLOGY CONSULTATION    Pt Name: Marya Hester  MRN: [id number]  YOB: 1970  Date of evaluation: 6/7/2022    REASON FOR CONSULTATION:  Breast cancer  REQUESTING PHYSICIAN: Dr Kayla Mayers    History Obtained From:  patient and old medical records    HISTORY OF PRESENT ILLNESS:    Diagnosis  · Invasive ductal carcinoma, right breast, April 2022  · ER 96%, MO 97%, HER-2 negative, Ki67 3%  · MammaPrint-Luminal A/low risk  · Stage I  · pT1b, (sn)pN0, pMx     Treatment Summary  · 5/11/22 Right breast lumpectomy with negative SLNB by Dr Kayla Mayers  · 5/11/22 IORT 2000 cGy to right breast   · 5/12/22 Initiated endocrine hormone therapy with Tamoxifen 20mg daily    Cancer History  Simona Gonzalez was first seen by me on 6/7/2022. She was referred by Dr. Juliet Salgado for a diagnosis of stage I breast cancer. This was an screening detected lesion. · 3/29/22 Bilateral screening mammogram [facility]): There is an area of increased density with possible distortion toward 11-12 o'clock right the right breast. Further evaluation is recommended with ultrasound. · 4/6/22 US right breast [facility]): There is an area of abnormal density, with distortion superiorly in the right breast on the previous mammogram. This could be due to fibrocystic change or neoplastic disease and tomographic biopsy is recommended for further evaluation of this suspicious abnormality. · 4/14/22 Right breast mass at 12:00, ultrasound-guided core needle biopsies:  Invasive carcinoma no special type (ductal). Histologic grade (Jennifer histologic score): Glandular (acinar)/tubular differentiation: Score 1. Nuclear pleomorphism: Score 2. Mitotic rate: Score 1. Overall grade: Grade 1. Maximum tumor diameter is at least 0.5 cm. ER 96%, MO 97%, HER-2 negative, Ki67 3%, MammaPrint-Luminal A/low risk.   · 4/14/22 Diagnostic right breast mammogram: Focus of irregular nodularity and architectural distortion within the upper outer quadrant of the right breast 12 to 1:00 position mid depth. This was surgically biopsied under ultrasound guidance and is highly suspicious for a primary breast neoplasm based on both mammographic imaging and ultrasound. There are associated indeterminate calcifications associated with this lesion. An additional focus of indeterminate calcifications are noted more anteriorly within the upper outer quadrant of the right breast at the 9 to 10:00 position. ACR BI-RADS Category 5-highly suspicious for primary breast neoplasm. Biopsy has been performed under ultrasound guidance. · 4/25/22 MRI bilateral breast: Abnormal MRI of the right breast, with a spiculated mass and biopsy proven malignancy demonstrated at 12:00, measuring approximately 1.3 x 0.9 x 2.3 cm, approximately 5.2 cm from the nipple. There is mild reactive enhancement around the mass likely related to the biopsy. No evidence of internal mammary or axillary lymphadenopathy. No additional mass or abnormal enhancement is identified within either breast. BI-RADS Category 6, known malignancy. Definitive treatment is recommended. Note: A negative contrast-enhanced breast MRI has a high sensitivity for detecting and excluding invasive ductal carcinoma 5mm and larger; however, appropriate clinical and mammographic follow-up are always recommended. Breast MRI may not detect some cases of DCIS or less angiogenic neoplasms, therefore it should not be used to \"exclude\" DCIS or lobular neoplasia. If there are suspicious calcifications or worrisome palpable masses, then biopsy should still be considered based on clinical assessment. · 5/11/22-Breast, right lumpectomy by Dr. Margarita Womack at [facility]: Infiltrating ductal carcinoma, no special type, grade 1. Infiltrating carcinoma measures 0.6 cm in greatest dimension. Infiltrating carcinoma extends to within 0.7 cm of the nearest lateral surgical excision margin.  Low-grade ductal carcinoma in situ is identified in conjunction with the invasive lesion. In situ carcinoma is located 0.7 cm from the nearest lateral surgical excision margin. Breast, excision of additional right breast caudal margin: Benign breast parenchyma with changes consistent with fibrocystic mastopathy. Breast, excision of additional right breast medial margin: Benign breast parenchyma with changes consistent with fibrocystic mastopathy and mild ductal hyperplasia of the usual type. Breast, excision of additional right breast deep margin: Benign breast with changes consistent with fibrocystic mastopathy. Breast, excision of additional right breast lateral margin: Benign breast with changes consistent with fibrocystic mastopathy. Lymph node, right sentinel lymph node biopsy: 3 lymph nodes, negative for evidence of malignancy. AJCC STAGE: pT1b, (sn)pN0, pMx   · 5/11/22 IORT 2000 cGy to right breast   · 5/12/22 Initiated endocrine hormone therapy with Tamoxifen 20mg daily  · 6/7/2022-she was first seen by me. She is status post right lumpectomy/IORT 2000 cGy. Recommended adjuvant tamoxifen x5 years. Patient is perimenopausal.  Low clinical risk. MammaPrint luminal type A. Therefore I did not recommend adjuvant chemotherapy. She has very good prognosis.       Past Medical History:    Past Medical History:   Diagnosis Date    Breast lump        Past Surgical History:    Past Surgical History:   Procedure Laterality Date    BREAST BIOPSY Left 2008    Benign    BREAST LUMPECTOMY Right 5/12/2022    RIGHT LUMPECTOMY AND SN, PREOP US,INTRAOP US GUIDED NL, PEC BLOCK, BIOZORB, FLAPS, MARGIN PROBE, IORT performed by Loren James MD at [place] [address] RIGHT Right 4/14/2022    US BREAST NEEDLE BIOPSY RIGHT 4/14/2022 [facility] [address] [facility] [address]       Social History:    Marital status:   Smoking status:No  ETOH status:Occasional  Resides: [address] [place] [address] [address]    Family History:   Family History   Problem Relation Age of Onset    Prostate Cancer Father Alex 55 Medications:    Current Outpatient Medications   Medication Sig Dispense Refill    tamoxifen (NOLVADEX) 20 MG tablet Take 1 tablet by mouth daily 30 tablet 5    busPIRone (BUSPAR) 5 MG tablet Take 1 tablet by mouth 3 times daily 90 tablet 0    HYDROcodone-acetaminophen (NORCO) 5-325 MG per tablet Take 1 tablet by mouth every 6 hours as needed for Pain. 12 tablet 0    cephALEXin (KEFLEX) 500 MG capsule Take 1 capsule by mouth 3 times daily 30 capsule 0    montelukast (SINGULAIR) 10 MG tablet Take 10 mg by mouth nightly      mupirocin (BACTROBAN NASAL) 2 % nasal ointment Apply to each nostril BID 5 days prior to surgery 1 each 0     No current facility-administered medications for this visit. Allergies:    Allergies   Allergen Reactions    Monistat [Miconazole] Other (See Comments)     Extreme burning sensation    Naproxen Rash     Facial rash and nausea         Subjective   REVIEW OF SYSTEMS:   CONSTITUTIONAL: no fever, night sweats, no fatigue;  HEENT: no blurring of vision, no double vision, no hearing difficulty, no tinnitus, no ulceration, no dysplasia, no epistaxis;  LUNGS: no cough, no hemoptysis, no wheeze,  no shortness of breath;  CARDIOVASCULAR: no palpitation, no chest pain, no shortness of breath;  GI: no abdominal pain, no nausea, no vomiting, no diarrhea, no constipation;  HAMIDA: no dysuria, no hematuria, no frequency or urgency, no nephrolithiasis;  MUSCULOSKELETAL: no joint pain, no swelling, no stiffness;  ENDOCRINE: no polyuria, no polydipsia, no cold or heat intolerance;  HEMATOLOGY: no easy bruising or bleeding, no history of clotting disorder;  DERMATOLOGY: no skin rash, no eczema, no pruritus;  PSYCHIATRY: no depression, no anxiety, no panic attacks, no suicidal ideation, no homicidal ideation;  NEUROLOGY: no syncope, no seizures, no numbness or tingling of hands, no numbness or tingling of feet, no paresis;    Objective BP (!) 172/90   Pulse (!) 101   Wt 212 lb (96.2 kg)   SpO2 96%   BMI 28.75 kg/m²     PHYSICAL EXAM:  CONSTITUTIONAL: Alert, appropriate, no acute distress  EYES: Non icteric, EOM intact, pupils equal round   ENT: Mucus membranes moist, no oral pharyngeal lesions, external inspection of ears and nose are normal  NECK: Supple, no masses. No palpable thyroid mass  CHEST/LUNGS: CTA bilaterally, normal respiratory effort   CARDIOVASCULAR: Tachycardic, no murmurs. No lower extremity edema  ABDOMEN: soft non-tender, active bowel sounds, no HSM. No palpable masses  EXTREMITIES: warm, full ROM in all 4 extremities, no focal weakness. SKIN: warm, dry with no rashes or lesions  LYMPH: No cervical, clavicular, axillary, or inguinal lymphadenopathy  NEUROLOGIC: follows commands, non focal   PSYCH: mood and affect appropriate. Alert and oriented to time, place, person      LABORATORY RESULTS REVIEWED/ANALYZED BY ME:  Lab Results   Component Value Date    WBC 4.09 06/07/2022    HGB 13.6 06/07/2022    HCT 42.0 06/07/2022    MCV 93.8 06/07/2022     06/07/2022     Lab Results   Component Value Date    NEUTROABS 1.84 06/07/2022       RADIOLOGY STUDIES REVIEWED BY ME:  As above      ASSESSMENT:    No orders of the defined types were placed in this encounter. Tenzin Martinez was seen today for new patient. Diagnoses and all orders for this visit:    Malignant neoplasm of overlapping sites of right breast in female, estrogen receptor positive ([facility] [address]  -     tamoxifen (NOLVADEX) 20 MG tablet; Take 1 tablet by mouth daily    Care plan discussed with patient    Er+ NC+ carcinoma of breast, right (HCC)  -     tamoxifen (NOLVADEX) 20 MG tablet; Take 1 tablet by mouth daily       Stage I pT1b (sn)N0 cM0 ER positive/NC positive, HER2 negative IDC right breast  The patient was counseled today about diagnosis, staging, prognosis, diagnostic tests, medications, side effects and disease management.    Essentially, findings of very early stage breast cancer, HR positive, HER2 negative  Very low clinical risk. Grade 1. She has a very good prognosis. I do not recommend adjuvant chemotherapy. Recommend adjuvant endocrine therapy with tamoxifen (patient is perimenopausal). Last menstrual period May 2022. Continue treatment for 5 years  Consider breast cancer index at year 5    Last discussion about side effects of tamoxifen to include but not limited to risk of blood clots, cataracts, joint pain, mood swings, depression, hot flashes etc.  Patient notes understanding. PLAN:  · RTC with MATILDE Serna, 6 months  · Continue Tamoxifen 20mg daily-script sent x5 years  · Continue follow-up with Dr. Bairon Paz for mammograms      I, Jim Wyman, am pre-charting as a registered nurse for Cynthia Matt MD. Electronically signed by Jim Wyman RN on 6/7/2022 at 12:25 PM CDT. Jany Wright am scribing for Cynthia Matt MD. Electronically signed by Jim Wyman RN on 6/7/2022 at 1:38 PM CDT. I, Dr Mary Monteiro, personally performed the services described in this documentation as scribed by Jim Wyman RN in my presence and is both accurate and complete. I have seen, examined and reviewed this patient medication list, appropriate labs and imaging studies. I reviewed relevant medical records and others physicians notes. I discussed the plans of care with the patient. I answered all the questions to the patients satisfaction. I have also reviewed the chief complaint (CC) and part of the history (History of Present Illness (HPI), Past Family Social History [facility]), or Review of Systems (ROS) and made changes when appropriated.        (Please note that portions of this note were completed with a voice recognition program. Efforts were made to edit the dictations but occasionally words are mis-transcribed.)    Electronically signed by Cynthia Matt MD on 6/7/2022 at 4:32 PM      The total time (50min)I spent to see the patient today includes at least one or more of the following: preparing to see the patient by reviewing prior tests, prior notes or other relevant information, performing appropriate independent examination and evaluation, counseling, ordering of medications, tests or procedures, communicating with other healthcare professionals when appropriated to coordinate care, documenting clinic information in the electronic medical record or other health records, independently interpreting results of tests, managing test results and communicating the results to the patient/family or caregiver.

## 2022-06-07 ENCOUNTER — OFFICE VISIT (OUTPATIENT)
Dept: HEMATOLOGY | Age: 52
End: 2022-06-07
Payer: COMMERCIAL

## 2022-06-07 ENCOUNTER — HOSPITAL ENCOUNTER (OUTPATIENT)
Dept: INFUSION THERAPY | Age: 52
Discharge: HOME | End: 2022-06-07
Payer: COMMERCIAL

## 2022-06-07 VITALS
DIASTOLIC BLOOD PRESSURE: 90 MMHG | SYSTOLIC BLOOD PRESSURE: 172 MMHG | BODY MASS INDEX: 28.75 KG/M2 | HEART RATE: 101 BPM | OXYGEN SATURATION: 96 % | WEIGHT: 212 LBS

## 2022-06-07 DIAGNOSIS — C50.811 MALIGNANT NEOPLASM OF OVERLAPPING SITES OF RIGHT FEMALE BREAST, UNSPECIFIED ESTROGEN RECEPTOR STATUS (HCC): ICD-10-CM

## 2022-06-07 DIAGNOSIS — Z17.0 ER+ PR+ CARCINOMA OF BREAST, RIGHT (HCC): ICD-10-CM

## 2022-06-07 DIAGNOSIS — Z71.89 CARE PLAN DISCUSSED WITH PATIENT: ICD-10-CM

## 2022-06-07 DIAGNOSIS — C50.811 MALIGNANT NEOPLASM OF OVERLAPPING SITES OF RIGHT BREAST IN FEMALE, ESTROGEN RECEPTOR POSITIVE (HCC): Primary | ICD-10-CM

## 2022-06-07 DIAGNOSIS — C50.911 ER+ PR+ CARCINOMA OF BREAST, RIGHT (HCC): ICD-10-CM

## 2022-06-07 DIAGNOSIS — Z17.0 MALIGNANT NEOPLASM OF OVERLAPPING SITES OF RIGHT BREAST IN FEMALE, ESTROGEN RECEPTOR POSITIVE (HCC): Primary | ICD-10-CM

## 2022-06-07 LAB
BASOPHILS ABSOLUTE: 0.03 K/UL (ref 0.01–0.08)
BASOPHILS RELATIVE PERCENT: 0.7 % (ref 0.1–1.2)
EOSINOPHILS ABSOLUTE: 0.1 K/UL (ref 0.04–0.54)
EOSINOPHILS RELATIVE PERCENT: 2.4 % (ref 0.7–7)
HCT VFR BLD CALC: 42 % (ref 34.1–44.9)
HEMOGLOBIN: 13.6 G/DL (ref 11.2–15.7)
LYMPHOCYTES ABSOLUTE: 1.6 K/UL (ref 1.18–3.74)
LYMPHOCYTES RELATIVE PERCENT: 39.1 % (ref 19.3–53.1)
MCH RBC QN AUTO: 30.4 PG (ref 25.6–32.2)
MCHC RBC AUTO-ENTMCNC: 32.4 G/DL (ref 32.3–35.5)
MCV RBC AUTO: 93.8 FL (ref 79.4–94.8)
MONOCYTES ABSOLUTE: 0.51 K/UL (ref 0.24–0.82)
MONOCYTES RELATIVE PERCENT: 12.5 % (ref 4.7–12.5)
NEUTROPHILS ABSOLUTE: 1.84 K/UL (ref 1.56–6.13)
NEUTROPHILS RELATIVE PERCENT: 45.1 % (ref 34–71.1)
PDW BLD-RTO: 12.5 % (ref 11.7–14.4)
PLATELET # BLD: 213 K/UL (ref 182–369)
PMV BLD AUTO: 9.9 FL (ref 7.4–10.4)
RBC # BLD: 4.48 M/UL (ref 3.93–5.22)
WBC # BLD: 4.09 K/UL (ref 3.98–10.04)

## 2022-06-07 PROCEDURE — 99211 OFF/OP EST MAY X REQ PHY/QHP: CPT

## 2022-06-07 PROCEDURE — 85025 COMPLETE CBC W/AUTO DIFF WBC: CPT

## 2022-06-07 PROCEDURE — 99204 OFFICE O/P NEW MOD 45 MIN: CPT | Performed by: INTERNAL MEDICINE

## 2022-06-07 RX ORDER — TAMOXIFEN CITRATE 20 MG/1
20 TABLET ORAL DAILY
Qty: 30 TABLET | Refills: 5 | Status: SHIPPED | OUTPATIENT
Start: 2022-06-07

## 2022-06-16 DIAGNOSIS — Z17.0 MALIGNANT NEOPLASM OF OVERLAPPING SITES OF RIGHT BREAST IN FEMALE, ESTROGEN RECEPTOR POSITIVE (HCC): ICD-10-CM

## 2022-06-16 DIAGNOSIS — C50.811 MALIGNANT NEOPLASM OF OVERLAPPING SITES OF RIGHT BREAST IN FEMALE, ESTROGEN RECEPTOR POSITIVE (HCC): ICD-10-CM

## 2022-06-16 RX ORDER — BUSPIRONE HYDROCHLORIDE 5 MG/1
TABLET ORAL
Qty: 90 TABLET | Refills: 0 | OUTPATIENT
Start: 2022-06-16

## 2022-06-16 RX ORDER — BUSPIRONE HYDROCHLORIDE 5 MG/1
5 TABLET ORAL 3 TIMES DAILY
Qty: 90 TABLET | Refills: 0 | Status: SHIPPED | OUTPATIENT
Start: 2022-06-16 | End: 2022-07-18

## 2022-06-17 NOTE — PROGRESS NOTES
HISTORY OF PRESENT ILLNESS:    Ms. Antonino Mishra today for a one month post op breast check. This is following right partial mastectomy with right sentinel lymph node biopsy with IORT on 5/12/2022. She is recently status post ultrasound guided breast biopsy  on the right which revealed a 0.5 cm low grade invasive ductal carcinoma. ER is positive at 96%. [place] is positive at 97%. Her2 is negative. Ki67 is low at 3%. Mammaprint is low risk Luminal Type A. MRI-4/24/2022  COMPARISON: Ultrasound of the right breast 4/6/2022, diagnostic  mammograms of the right breast 4/14/2022, outside facility screening  digital mammograms 3/29/2022 from [facility]. 3-D MIP images demonstrate moderate diffuse background enhancement. In  the right breast at 12:00, approximately 5.2 cm from the nipple there  is a spiculated mass that represents known biopsy-proven malignancy,  this measures approximately 1.3 x 0.9 x 2.3 cm, demonstrating mixed  kinetics, the worst curve is rapid, washout (malignant-type kinetics). There is patchy enhancement around this mass likely reactive related  to recent biopsy. T2 images demonstrate a Hydro clip within the mass. T2 images also demonstrate numerous cysts throughout both breasts,  which range in size from the smallest 1 to 2 mm to as large as 7 mm. No internal mammary or axillary lymphadenopathy is identified. There  appears to be a small intramammary lymph node at 10:00 in the right  breast, measuring approximately 7 x 4 mm. A fatty hilum is visible. No  contralateral left breast mass or suspicious enhancement is  identified. No skin thickening or nipple retraction is identified. Incidentally noted are multiple benign-appearing cysts within the  liver. IMPRESSION:  Abnormal MRI of the right breast, with a spiculated mass  and biopsy proven malignancy demonstrated at 12:00, measuring  approximately 1.3 x 0.9 x 2.3 cm, approximately 5.2 cm from the  nipple. There is mild reactive enhancement around the mass likely  related to the biopsy. No evidence of internal mammary or axillary  lymphadenopathy. No additional mass or abnormal enhancement is  identified within either breast. BI-RADS Category 6, known malignancy. Definitive treatment is recommended. Signed by Dr Nghia Lemon. Joey       PATHOLOGY REVEALS:  FINAL DIAGNOSIS:     A.  Breast, right lumpectomy:   1.  Infiltrating ductal carcinoma, no special type, grade 1.   2.  Infiltrating carcinoma measures 0.6 cm in greatest dimension. 3.  Infiltrating carcinoma extends to within 0.7 cm of the nearest   lateral surgical excision margin. 4.  Low-grade ductal carcinoma in situ is identified in conjunction with   the invasive lesion. 5.  In situ carcinoma is located 0.7 cm from the nearest lateral surgical   excision margin. B.  Breast, excision of additional right breast caudal margin: Benign   breast parenchyma with changes consistent with fibrocystic mastopathy. C.  Breast, excision of additional right breast medial margin: Benign   breast parenchyma with changes consistent with fibrocystic mastopathy and   mild ductal hyperplasia of the usual type. D.  Breast, excision of additional right breast deep margin: Benign   breast with changes consistent with fibrocystic mastopathy. E.  Breast, excision of additional right breast lateral margin: Benign   breast with changes consistent with fibrocystic mastopathy. F.  Lymph node, right sentinel lymph node biopsy: 3 lymph nodes, negative   for evidence of malignancy. AJCC STAGE: pT1b, (sn)pN0, pMx     PHYSICAL EXAM:  The  wounds look good with no evidence of infection, fluid accumulation, or skin necrosis.      IMPRESSION:    Doing well s/p right partial mastectomy with right sentinel lymph node biopsy with IORT     PLAN: Follow-up in 5 months with unilateral right mammogram    I have seen, examined and reviewed this patient medication list, appropriate labs and imaging studies. I reviewed relevant medical records and others physicians notes. I discussed the plans of care with the patient. I answered all the questions to the patients satisfaction. I, Dr Martin Crawford, personally performed the services described in this documentation as scribed by Allyson Albrecht MA in my presence and is both accurate and complete. (Please note that portions of this note were completed with a voice recognition program. Efforts were made to edit the dictations but occasionally words are mis-transcribed.)  Over 50% of the total visit time of 20 minutes in face to face encounter with the patient, out of which more than 50% of the time was spent in counseling patient or family and coordination of care. Counseling included but was not limited to time spent reviewing labs, imaging studies/ treatment plan and answering questions.

## 2022-06-20 ENCOUNTER — OFFICE VISIT (OUTPATIENT)
Dept: SURGERY | Age: 52
End: 2022-06-20

## 2022-06-20 VITALS — SYSTOLIC BLOOD PRESSURE: 128 MMHG | HEART RATE: 80 BPM | DIASTOLIC BLOOD PRESSURE: 84 MMHG

## 2022-06-20 DIAGNOSIS — C50.811 MALIGNANT NEOPLASM OF OVERLAPPING SITES OF RIGHT BREAST IN FEMALE, ESTROGEN RECEPTOR POSITIVE (HCC): Primary | ICD-10-CM

## 2022-06-20 DIAGNOSIS — Z98.890 STATUS POST RIGHT BREAST LUMPECTOMY: ICD-10-CM

## 2022-06-20 DIAGNOSIS — Z17.0 MALIGNANT NEOPLASM OF OVERLAPPING SITES OF RIGHT BREAST IN FEMALE, ESTROGEN RECEPTOR POSITIVE (HCC): Primary | ICD-10-CM

## 2022-06-20 PROCEDURE — 99024 POSTOP FOLLOW-UP VISIT: CPT | Performed by: SURGERY

## 2022-06-21 DIAGNOSIS — Z17.0 MALIGNANT NEOPLASM OF OVERLAPPING SITES OF RIGHT BREAST IN FEMALE, ESTROGEN RECEPTOR POSITIVE (HCC): Primary | ICD-10-CM

## 2022-06-21 DIAGNOSIS — C50.811 MALIGNANT NEOPLASM OF OVERLAPPING SITES OF RIGHT BREAST IN FEMALE, ESTROGEN RECEPTOR POSITIVE (HCC): Primary | ICD-10-CM

## 2022-06-21 DIAGNOSIS — Z98.890 STATUS POST RIGHT BREAST LUMPECTOMY: ICD-10-CM

## 2022-07-16 DIAGNOSIS — C50.811 MALIGNANT NEOPLASM OF OVERLAPPING SITES OF RIGHT BREAST IN FEMALE, ESTROGEN RECEPTOR POSITIVE (HCC): ICD-10-CM

## 2022-07-16 DIAGNOSIS — Z17.0 MALIGNANT NEOPLASM OF OVERLAPPING SITES OF RIGHT BREAST IN FEMALE, ESTROGEN RECEPTOR POSITIVE (HCC): ICD-10-CM

## 2022-07-18 DIAGNOSIS — C50.811 MALIGNANT NEOPLASM OF OVERLAPPING SITES OF RIGHT BREAST IN FEMALE, ESTROGEN RECEPTOR POSITIVE (HCC): ICD-10-CM

## 2022-07-18 DIAGNOSIS — Z17.0 MALIGNANT NEOPLASM OF OVERLAPPING SITES OF RIGHT BREAST IN FEMALE, ESTROGEN RECEPTOR POSITIVE (HCC): ICD-10-CM

## 2022-07-18 RX ORDER — BUSPIRONE HYDROCHLORIDE 5 MG/1
5 TABLET ORAL 3 TIMES DAILY
Qty: 90 TABLET | Refills: 0 | OUTPATIENT
Start: 2022-07-18 | End: 2022-08-17

## 2022-07-18 RX ORDER — BUSPIRONE HYDROCHLORIDE 5 MG/1
TABLET ORAL
Qty: 90 TABLET | Refills: 0 | Status: SHIPPED | OUTPATIENT
Start: 2022-07-18

## 2022-07-19 DIAGNOSIS — C50.811 MALIGNANT NEOPLASM OF OVERLAPPING SITES OF RIGHT BREAST IN FEMALE, ESTROGEN RECEPTOR POSITIVE (HCC): Primary | ICD-10-CM

## 2022-07-19 DIAGNOSIS — Z17.0 MALIGNANT NEOPLASM OF OVERLAPPING SITES OF RIGHT BREAST IN FEMALE, ESTROGEN RECEPTOR POSITIVE (HCC): Primary | ICD-10-CM

## 2022-08-11 ENCOUNTER — APPOINTMENT (OUTPATIENT)
Dept: OCCUPATIONAL THERAPY | Age: 52
End: 2022-08-11
Payer: COMMERCIAL

## 2022-08-11 ENCOUNTER — HOSPITAL ENCOUNTER (OUTPATIENT)
Dept: OCCUPATIONAL THERAPY | Age: 52
Setting detail: THERAPIES SERIES
Discharge: HOME | End: 2022-08-11
Payer: COMMERCIAL

## 2022-08-11 VITALS — BODY MASS INDEX: 28.72 KG/M2 | HEIGHT: 72 IN | WEIGHT: 212.03 LBS

## 2022-08-11 PROCEDURE — 93702 BIS XTRACELL FLUID ANALYSIS: CPT

## 2022-08-11 PROCEDURE — 97165 OT EVAL LOW COMPLEX 30 MIN: CPT

## 2022-08-11 NOTE — PROGRESS NOTES
Occupational Therapy: Initial Evaluation   Patient: Ramona Westbrook (29 y.o. female)   Examination Date:   Plan of Care Certification Period: 2022 to  2022      :  1970  MRN: [id number]  CSN: [id number]   Insurance: Payor: [facility] / Plan: [facility] PPO / Product Type: *No Product type* /   Insurance ID: [id number] - ([facility]) Secondary Insurance (if applicable): Insurance Information: [facility]   Referring Physician: MD Lola Xie MD   PCP: No primary care provider on file. Visits to Date/Visits Approved:   /      No Show/Cancelled Appts:    /       Medical Diagnosis: Malignant neoplasm of overlapping sites of right female breast [C50.811]  Estrogen receptor positive status (ER+) [Z17.0] C50.811 Malignant neoplasm of overlapping sites of right breast in female, estrogen receptor positive  No data recorded     [facility]   Patient assessed for rehabilitation services?: Yes  Self reported health status[de-identified] Excellent    Medical History: Chart Reviewed: Yes   Past Medical History:   Diagnosis Date    Breast lump      Surgical History:   Past Surgical History:   Procedure Laterality Date    BREAST BIOPSY Left 2008    Benign    BREAST LUMPECTOMY Right 2022    RIGHT LUMPECTOMY AND SN, PREOP US,INTRAOP US GUIDED NL, PEC BLOCK, BIOZORB, FLAPS, MARGIN PROBE, IORT performed by Lola Lagunas MD at [facility] [address] Right 2022    US BREAST NEEDLE BIOPSY RIGHT 2022 [facility]     History obtained from[de-identified] Patient, Chart Review,      Family/Caregiver Present: No    Subjective History: Onset Date: 22       Lymphedema History (if Applicable):  Oncology History (if Applicable):   History of cellulitis: No  Family history of lymphedema: No  Previous lymphedema treament: No History of Cancer and/or Treatment: Yes  Medical Oncologist: Lola Lagunas MD  Date of first cancer diagnosis: 4/24/22  Cancer Type/Stage: Malignant neoplasm of overlapping of right breast  History of radiation : Yes  Radiation type: IORT  Date of completion: 05/12/22  Number of treatments: 1  History of chemotherapy: No  History of surgery: Yes  Procedure/Results: right breast lumpectomy  History of Lymph Node Removal: Yes  Type/Location of Lymph Nodes: right axillary lymph nodes  Number of Lymph Removed: 3               Learning/Language: Learning  Does the patient/guardian have any barriers to learning?: No barriers     Pain Screening    Pain Screening  Patient Currently in Pain: No    Functional Status    Dominant Hand: : Right      OBJECTIVE EXAMINATION       Review of Systems / Circulatory Assessment:  Height and Weight  Height: 6' (182.9 cm)  Weight: 212 lb 0.5 oz (96.2 kg)  BSA (Calculated - sq m): 2.21 sq meters  BMI (Calculated): 28.8      Measurements: Circumferential Limb Measurements   Limb Measurements Assessed: UE  Left Measurements Right Measurements   Left UE Circumferential Measurements (cm)   Patient Position: Seated  Dominent Hand: Right  Height: 6' (182.9 cm)  Weight: 212 lb 0.5 oz (96.2 kg)  BMI (Calculated): 28.75  Reference for Starting Point: 11 cm measuring proximally from most distal point on 3rd digit; first measurement begins near MCPs, then every 5 cm after  0 (Starting Measurement): 18.5  (+ 4 cm): 20.3  (+8 cm): 16.8  (+12 cm): 19.5  (+16 cm): 22.6  (+20 cm): 25.5  (+24 cm): 27.7  (+28 cm): 30.5  (+32 cm): 32  (+36 cm): 34.5  (+40 cm): 37  (+56 cm): 38.8  Total Girth - Left UE: 323.7  Left LE Circumferential Measurements (cm)   Height: 6' (182.9 cm)  Weight: 212 lb 0.5 oz (96.2 kg)  BMI (Calculated): 28.75 Right UE Circumferential Measurements (cm)   Patient Position: Seated  Dominent Hand: Right  Height: 6' (182.9 cm)  Weight: 212 lb 0.5 oz (96.2 kg)  BMI (Calculated): 28.75  Reference for Starting Point: 11 cm measuring proximally from most distal point on 3rd digit; first measurement begins near MCPs, then every 5 cm after  0 (Starting Measurement): 19  (+ 4 cm): 20.7  (+8 cm): 17.5  (+12 cm): 19.8  (+16 cm): 23.4  (+20 cm): 27  (+24 cm): 28.5  (+28 cm): 31.3  (+32 cm): 33.6  (+36 cm): 34.8  (+40 cm): 37.7  (+56 cm): 39  Total Girth - Right UE: 332.3  Right LE Circumferential Measurements (cm)   Height: 6' (182.9 cm)  Weight: 212 lb 0.5 oz (96.2 kg)  BMI (Calculated): 28.75   Circumferential Limb Measurements Summary  Total Girth - Ininvolved Limb (cm): 332.3  Total Girth - Uninvolved Limb (cm): 323.7  Girth Difference (cm): 8.6  Percentage Difference (%): 2.66       ASSESSMENT     Stage of Lymphedema: Stage 0: Latency Stage/Lymphangiopathy (Pre-stage/Subclinical Stage)  Description: Stage 0 Latency: Transport capacity is \"sub-normal\" at present time, but still sufficient enough to manage the normal amount of lymphatic load. Lymphatic load could increase- placing strain on remaining, functioning lymph collectors. Over time collectors may fatigue from increased strain. This fatigue can lead to a further decline in transport capacity placing the patient at risk of developing lymphedema. Impression: Assessment: Patient participated in [facility] evaluation for lymphedema monitoring program this date s/p RUE breast lumpectomy on 5/12/2022. L-dex score 0.1 this date (within normal range) and BUE circumference measurement's taken. Based on measurements patient does not demonstrate lymphedema development at this time. Patient educated on programs purpose/benefits, anatomy/physiology of lymphedema, and signs/symptoms of lymphedema. Educated patient on RUE precautions of no needlesticks and BP readings being performed on effected UE. Patient verbalizes good understanding. Patient agreeable to continue monitoring program per MD request  returning every 3 months for another follow up till one year post op. Patient does not report any RUE swelling, discomfort, or tightness.  All patient questions answered and 3 month follow up appt scheduled after evaluation. GOALS     Patient Goal(s): Patient goals : No goals identified due to evaluation only. TREATMENT PLAN       REQUIRES OT FOLLOW-UP: Yes  Plan Comment: Evaluation only. 3 month follow up scheduled. Eval Complexity:   Decision Making: Low Complexity  Occupational Profile/History : Low  Exam: L-dex and BUE circumference measurements    Therapy Time  Individual Time In: 1440  Individual Time Out: 6262  Minutes: 24  Timed Code Treatment Minutes: 24 Minutes     Therapist Signature: Blossom Vitale OT   Electronically signed by VIKTORIA Berry, OTJONY/L, CLT on 8/11/2022 at 3:12 PM  Date: 0/07/8989     I certify that the above Occupational Therapy Services are being furnished while the patient is under my care. I agree with the treatment plan and certify that this therapy is necessary. Physician's Signature:  ___________________________   Date:_______                                                                   Rylan Argueta MD        Physician Comments: _______________________________________________    Please sign and return to [facility] OCCUPATIONAL THERAPY. Please fax to the location listed below.  [place]illa for this referral!    [address]  [address] [address]  Dept: [address]: [phone]       POC NOTE

## 2022-11-03 DIAGNOSIS — Z17.0 MALIGNANT NEOPLASM OF OVERLAPPING SITES OF RIGHT BREAST IN FEMALE, ESTROGEN RECEPTOR POSITIVE (HCC): Primary | ICD-10-CM

## 2022-11-03 DIAGNOSIS — C50.811 MALIGNANT NEOPLASM OF OVERLAPPING SITES OF RIGHT BREAST IN FEMALE, ESTROGEN RECEPTOR POSITIVE (HCC): Primary | ICD-10-CM

## 2022-11-11 ENCOUNTER — HOSPITAL ENCOUNTER (OUTPATIENT)
Dept: OCCUPATIONAL THERAPY | Age: 52
Setting detail: THERAPIES SERIES
Discharge: HOME | End: 2022-11-11
Payer: COMMERCIAL

## 2022-11-11 VITALS — BODY MASS INDEX: 29.08 KG/M2 | HEIGHT: 72 IN | WEIGHT: 214.7 LBS

## 2022-11-11 PROCEDURE — 93702 BIS XTRACELL FLUID ANALYSIS: CPT

## 2022-11-11 PROCEDURE — 97165 OT EVAL LOW COMPLEX 30 MIN: CPT

## 2022-11-11 NOTE — PROGRESS NOTES
Occupational Therapy: Initial Evaluation   Patient: Kristel Arzate (77 y.o. female)   Examination Date:   Plan of Care Certification Period: 2022 to  2022      :  1970  MRN: [id number]  CSN: [id number]   Insurance: Payor: [employer]aver / Plan: [employer]aver - [place] PPO / Product Type: *No Product type* /   Insurance ID: [id number] - ([address] [place] [address]) Secondary Insurance (if applicable): Insurance Information: [employer]BS   Referring Physician: MD Lesvia Johnston MD   PCP: No primary care provider on file. Visits to Date/Visits Approved:   /      No Show/Cancelled Appts:    /       Medical Diagnosis: Malignant neoplasm of overlapping sites of right female breast [C50.811]  Estrogen receptor positive status (ER+) [Z17.0] C50.811 Malignant neoplasm of overlapping sites of right breast in female, estrogen receptor positive  No data recorded     [facility]   Patient assessed for rehabilitation services?: Yes  Self reported health status[de-identified] Excellent    Medical History: Chart Reviewed: Yes   Past Medical History:   Diagnosis Date    Breast lump      Surgical History:   Past Surgical History:   Procedure Laterality Date    BREAST BIOPSY Left 2008    Benign    BREAST LUMPECTOMY Right 2022    RIGHT LUMPECTOMY AND SN, PREOP US,INTRAOP US GUIDED NL, PEC BLOCK, BIOZORB, FLAPS, MARGIN PROBE, IORT performed by Lesvia Warren MD at [facility] [address] Right 2022    US BREAST NEEDLE BIOPSY RIGHT 2022 [facility] [address]     History obtained from[de-identified] Patient, Chart Review,      Family/Caregiver Present: No    Subjective History: Onset Date: 22       Lymphedema History (if Applicable):  Oncology History (if Applicable):   History of cellulitis: No  Family history of lymphedema: No  Previous lymphedema treament: No History of Cancer and/or Treatment: Yes  Medical Oncologist: Lesvia Warren MD  Date of first cancer diagnosis: 4/24/22  Cancer Type/Stage: Malignant neoplasm of overlapping of right breast  History of radiation : Yes  Radiation type: IORT  Date of completion: 05/12/22  Number of treatments: 1  History of chemotherapy: No  History of surgery: Yes  Procedure/Results: right breast lumpectomy  History of Lymph Node Removal: Yes  Type/Location of Lymph Nodes: right axillary lymph nodes  Number of Lymph Removed: 3               Learning/Language: Learning  Does the patient/guardian have any barriers to learning?: No barriers     Pain Screening    Pain Screening  Patient Currently in Pain: No    Functional Status    Dominant Hand: : Right      OBJECTIVE EXAMINATION       Review of Systems / Circulatory Assessment:  Height and Weight  Height: 6' (182.9 cm)  Weight: 214 lb 11.2 oz (97.4 kg)  BSA (Calculated - sq m): 2.22 sq meters  BMI (Calculated): 29.2      Measurements: Circumferential Limb Measurements   Limb Measurements Assessed: UE  Left Measurements Right Measurements   Left UE Circumferential Measurements (cm)   Patient Position: Seated  Dominent Hand: Right  Height: 6' (182.9 cm)  Weight: 214 lb 11.2 oz (97.4 kg)  BMI (Calculated): 29.11  Reference for Starting Point: 11 cm measuring proximally from most distal point on 3rd digit; first measurement begins near MCPs, then every 5 cm after  0 (Starting Measurement): 18.8  (+ 4 cm): 20  (+8 cm): 17.3  (+12 cm): 19.8  (+16 cm): 23.3  (+20 cm): 26  (+24 cm): 28  (+28 cm): 29.3  (+32 cm): 31  (+36 cm): 32.5  (+40 cm): 35.3  (+44 cm): 27.8  Total Girth - Left UE: 309.1  Left LE Circumferential Measurements (cm)   Height: 6' (182.9 cm)  Weight: 214 lb 11.2 oz (97.4 kg)  BMI (Calculated): 29.11 Right UE Circumferential Measurements (cm)   Patient Position: Seated  Dominent Hand: Right  Height: 6' (182.9 cm)  Weight: 214 lb 11.2 oz (97.4 kg)  BMI (Calculated): 29.11  Reference for Starting Point: 11 cm measuring proximally from most distal point on 3rd digit; first measurement begins near MCPs, then every 5 cm after  0 (Starting Measurement): 18.9  (+ 4 cm): 21.5  (+8 cm): 17  (+12 cm): 20  (+16 cm): 23  (+20 cm): 26.7  (+24 cm): 28.5  (+28 cm): 29.6  (+32 cm): 31.4  (+36 cm): 33  (+40 cm): 35.4  (+44 cm): 28  Total Girth - Right UE: 313  Right LE Circumferential Measurements (cm)   Height: 6' (182.9 cm)  Weight: 214 lb 11.2 oz (97.4 kg)  BMI (Calculated): 29.11   Circumferential Limb Measurements Summary  Involved Limb : R UE  Uninvolved Limb : L UE  Total Girth - Ininvolved Limb (cm): 313  Total Girth - Uninvolved Limb (cm): 309.1  Girth Difference (cm): 3.9  Percentage Difference (%): 1.26       ASSESSMENT     Stage of Lymphedema: Stage 0: Latency Stage/Lymphangiopathy (Pre-stage/Subclinical Stage)  Description: Stage 0 Latency: Transport capacity is \"sub-normal\" at present time, but still sufficient enough to manage the normal amount of lymphatic load. Lymphatic load could increase- placing strain on remaining, functioning lymph collectors. Over time collectors may fatigue from increased strain. This fatigue can lead to a further decline in transport capacity placing the patient at risk of developing lymphedema. Impression: Assessment: Patient participated in [facility] evaluation for lymphedema monitoring program this date s/p RUE breast lumpectomy on 5/12/2022. L-dex score -1.0 this date (within normal range) and BUE circumference measurement's taken. Based on measurements patient does not demonstrate lymphedema development at this time. Patient educated on programs purpose/benefits, anatomy/physiology of lymphedema, and signs/symptoms of lymphedema. Educated patient on RUE precautions of no needlesticks and BP readings being performed on effected UE. Patient verbalizes good understanding. Patient agreeable to continue monitoring program per MD request  returning every 3 months for another follow up till one year post op. Patient does not report any RUE swelling, discomfort, or tightness.  All patient questions answered and 3 month follow up appt scheduled after evaluation. L-Dex® Analysis for Lymphedema    The patient had a 3 month f/u SOZO measurement which I reviewed today. The score is within normal limits at -1.0. Bioimpedance spectroscopy helps identify the onset of lymphedema in an arm or leg before patients experience noticeable swelling. Research has shown that 92% of patients with early detection of lymphedema using L-Dex combined with intervention do not progress to chronic lymphedema. Whenever possible, patients are tested for baseline L-Dex score before cancer treatment begins and then are reassessed during regular follow-up visits using the SOZO device. Otherwise, this can be started postoperatively and continued during regular follow-up visits. If the patients L-Dex score increases above normal levels, that is a sign that lymphedema is developing and a referral is made to physical therapy for further evaluation and early compression treatment. Lymphedema assessment with the SOZO L-Dex score is recommended to be done every 3 months for the first 3 years and then every 6 months for years 4 and 5 followed by annually afterwards. GOALS     Patient Goal(s): Patient goals : No goals identified due to evaluation only. TREATMENT PLAN       REQUIRES OT FOLLOW-UP: Yes  Additional Comments: Evaluation only. 3 month follow up scheduled. Eval Complexity:   Decision Making: Low Complexity  Exam: L-dex and BUE circumference measurements    OT Treatment Completed:  N/A - Evaluation Only    Therapy Time  Individual Time In: 1400  Individual Time Out: 5356  Minutes: 25  Timed Code Treatment Minutes: 25 Minutes     Therapist Signature: Foreign Herron OT   Electronically signed by VIKTORIA Rodriguez OTR/L, CLT on 11/11/2022 at 2:32 PM   Date: 06/47/0157     I certify that the above Occupational Therapy Services are being furnished while the patient is under my care.  I agree with the treatment plan and certify that this therapy is necessary. Physician's Signature:  ___________________________   Date:_______                                                                   Ladan Gurrola MD        Physician Comments: _______________________________________________    Please sign and return to [facility] OCCUPATIONAL THERAPY. Please fax to the location listed below.  [place]illa for this referral!    [address] [facility] OCCUPATIONAL THERAPY  [address] [address]  Dept: [address] [facility] [address]: [phone]       POC NOTE

## 2022-11-23 ENCOUNTER — HOSPITAL ENCOUNTER (OUTPATIENT)
Dept: WOMENS IMAGING | Age: 52
Discharge: HOME | End: 2022-11-23
Payer: COMMERCIAL

## 2022-11-23 ENCOUNTER — TELEPHONE (OUTPATIENT)
Dept: SURGERY | Age: 52
End: 2022-11-23

## 2022-11-23 DIAGNOSIS — Z17.0 MALIGNANT NEOPLASM OF OVERLAPPING SITES OF RIGHT BREAST IN FEMALE, ESTROGEN RECEPTOR POSITIVE (HCC): ICD-10-CM

## 2022-11-23 DIAGNOSIS — C50.811 MALIGNANT NEOPLASM OF OVERLAPPING SITES OF RIGHT BREAST IN FEMALE, ESTROGEN RECEPTOR POSITIVE (HCC): ICD-10-CM

## 2022-11-23 DIAGNOSIS — Z98.890 STATUS POST RIGHT BREAST LUMPECTOMY: ICD-10-CM

## 2022-11-23 DIAGNOSIS — R92.0 MICROCALCIFICATION OF RIGHT BREAST ON MAMMOGRAM: Primary | ICD-10-CM

## 2022-11-23 PROCEDURE — 77065 DX MAMMO INCL CAD UNI: CPT

## 2022-11-23 NOTE — TELEPHONE ENCOUNTER
Ivana Elizabeth requests that clinic return their call. The best time to reach her is Anytime. Ivana Elizabeth stated that she is going to need a biopsy from her last mammo result and would like to schedule the procedure before the end of the year if possible. Please contact patient to advise. Thank you.

## 2022-11-23 NOTE — TELEPHONE ENCOUNTER
I called patient to let her know I would send Antonia Jason a message to address and that we will be back in the office on Monday after the holiday.

## 2022-11-28 ENCOUNTER — TELEPHONE (OUTPATIENT)
Dept: SURGERY | Age: 52
End: 2022-11-28

## 2022-11-28 DIAGNOSIS — Z17.0 MALIGNANT NEOPLASM OF OVERLAPPING SITES OF RIGHT BREAST IN FEMALE, ESTROGEN RECEPTOR POSITIVE (HCC): Primary | ICD-10-CM

## 2022-11-28 DIAGNOSIS — C50.811 MALIGNANT NEOPLASM OF OVERLAPPING SITES OF RIGHT BREAST IN FEMALE, ESTROGEN RECEPTOR POSITIVE (HCC): Primary | ICD-10-CM

## 2022-11-28 RX ORDER — DIAZEPAM 5 MG/1
5 TABLET ORAL EVERY 8 HOURS PRN
Qty: 20 TABLET | Refills: 0 | Status: SHIPPED | OUTPATIENT
Start: 2022-11-28 | End: 2022-12-08

## 2022-11-28 RX ORDER — DIAZEPAM 5 MG/1
TABLET ORAL
Qty: 20 TABLET | Refills: 0 | Status: SHIPPED | OUTPATIENT
Start: 2022-11-28 | End: 2022-11-28 | Stop reason: SDUPTHER

## 2022-11-28 NOTE — TELEPHONE ENCOUNTER
Kristina Rodriguez called to schedule a  breast biopsy in re: to mammogram she had done on 11/23  . Please be advised that the best time to call her to accommodate their needs is Anytime. Thank you.

## 2022-11-28 NOTE — TELEPHONE ENCOUNTER
I called the patient with 12/7/22 date for a stereo bx - she has other appts that day and will need to R/S but she has many questions and is also requesting Valium for the bx    Cc: Brennen Rueda PA-C

## 2022-11-29 NOTE — TELEPHONE ENCOUNTER
Called pt to schedule bx for 12/07/22 @ [facility] - She said she has other appts that day but also has several questions - Forwarded message to Beatriz who discussed with Dr Brenton Onofre to call patient

## 2022-12-05 ENCOUNTER — TELEPHONE (OUTPATIENT)
Dept: SURGERY | Age: 52
End: 2022-12-05

## 2022-12-05 DIAGNOSIS — Z85.3 PERSONAL HISTORY OF BREAST CANCER: Primary | ICD-10-CM

## 2022-12-05 RX ORDER — DIAZEPAM 5 MG/1
TABLET ORAL
Qty: 20 TABLET | Refills: 0 | Status: SHIPPED | OUTPATIENT
Start: 2022-12-05 | End: 2022-12-15

## 2022-12-05 NOTE — TELEPHONE ENCOUNTER
12/5/2022 Patient called and stated biopsy was moved and she has questions. Please return call.     Contact # [phone]  janelle

## 2022-12-05 NOTE — TELEPHONE ENCOUNTER
Called patient and she was wanting to know if she could get another refill on Valium. Her biopsy got rescheduled and she states she has been a [employer]. I let her know that I would talk to Dr. Henri Giles and get back with her.

## 2022-12-06 DIAGNOSIS — C50.811 MALIGNANT NEOPLASM OF OVERLAPPING SITES OF RIGHT BREAST IN FEMALE, ESTROGEN RECEPTOR POSITIVE (HCC): Primary | ICD-10-CM

## 2022-12-06 DIAGNOSIS — Z17.0 MALIGNANT NEOPLASM OF OVERLAPPING SITES OF RIGHT BREAST IN FEMALE, ESTROGEN RECEPTOR POSITIVE (HCC): Primary | ICD-10-CM

## 2022-12-06 NOTE — PROGRESS NOTES
Progress Note      Pt Name: Camila Wong: 1970  MRN: [id number]    Date of evaluation: 12/11/2022  History Obtained From:  patient, electronic medical record    CHIEF COMPLAINT:    Chief Complaint   Patient presents with    Follow-up    Breast Cancer     HISTORY OF PRESENT ILLNESS:    Carlos Fritz is a 47 y.o.  female who is currently being followed for invasive ductal carcinoma of the right breast, ER/WI positive and HER2/loco negative, stage I, diagnosed April 2022. Current recommendation is for adjuvant endocrine therapy with tamoxifen 20 mg p.o. daily for anticipated 10 years, through May 2032. Param Deleon presents today in scheduled follow-up for evaluation, side effect monitoring, lab monitoring and further treatment recommendations. She reported being compliant with her tamoxifen. Param Deleon had a 6-month follow-up right mammogram on 11/23/2022 that identified a calcification at the 10 o'clock position that warrants biopsy, she is scheduled for stereotactic biopsy on 12/13/2022 with Dr. Sammi Kulkarni. She is very nervous and anxious related to the upcoming biopsy. Today's clinic visit to include physical assessment, review of systems, any lab or radiographic findings that were available and plan of care are documented below. ONCOLOGIC HISTORY:     Diagnosis  Invasive ductal carcinoma, right breast, April 2022  ER 96%, WI 97%, HER-2 negative, Ki67 3%  MammaPrint-Luminal A/low risk  Stage I  pT1b, (sn)pN0, pMx      Treatment Summary  5/11/22 Right breast lumpectomy with negative SLNB by Dr Nitesh Todd  5/11/22 IORT 2000 cGy to right breast   5/12/22 Initiated endocrine hormone therapy with Tamoxifen 20mg daily, anticipate 10-year dosing through May 2032     Cancer History  Cody Olivas was first seen by Dr Adina Wilhelm on 6/7/2022. She was referred by Dr. Juan Goldsmith for a diagnosis of stage I breast cancer. This was an screening detected lesion.   3/29/22 Bilateral screening mammogram [facility]): There is an area of increased density with possible distortion toward 11-12 o'clock right the right breast. Further evaluation is recommended with ultrasound. 4/6/22 US right breast [facility]): There is an area of abnormal density, with distortion superiorly in the right breast on the previous mammogram. This could be due to fibrocystic change or neoplastic disease and tomographic biopsy is recommended for further evaluation of this suspicious abnormality. 4/14/22 Right breast mass at 12:00, ultrasound-guided core needle biopsies:  Invasive carcinoma no special type (ductal). Histologic grade (Jennifer histologic score): Glandular (acinar)/tubular differentiation: Score 1. Nuclear pleomorphism: Score 2. Mitotic rate: Score 1. Overall grade: Grade 1. Maximum tumor diameter is at least 0.5 cm. ER 96%, KS 97%, HER-2 negative, Ki67 3%, MammaPrint-Luminal A/low risk. 4/14/22 Diagnostic right breast mammogram: Focus of irregular nodularity and architectural distortion within the upper outer quadrant of the right breast 12 to 1:00 position mid depth. This was surgically biopsied under ultrasound guidance and is highly suspicious for a primary breast neoplasm based on both mammographic imaging and ultrasound. There are associated indeterminate calcifications associated with this lesion. An additional focus of indeterminate calcifications are noted more anteriorly within the upper outer quadrant of the right breast at the 9 to 10:00 position. ACR BI-RADS Category 5-highly suspicious for primary breast neoplasm. Biopsy has been performed under ultrasound guidance. 4/25/22 MRI bilateral breast: Abnormal MRI of the right breast, with a spiculated mass and biopsy proven malignancy demonstrated at 12:00, measuring approximately 1.3 x 0.9 x 2.3 cm, approximately 5.2 cm from the nipple. There is mild reactive enhancement around the mass likely related to the biopsy.  No evidence of internal mammary or axillary lymphadenopathy. No additional mass or abnormal enhancement is identified within either breast. BI-RADS Category 6, known malignancy. Definitive treatment is recommended. Note: A negative contrast-enhanced breast MRI has a high sensitivity for detecting and excluding invasive ductal carcinoma 5mm and larger; however, appropriate clinical and mammographic follow-up are always recommended. Breast MRI may not detect some cases of DCIS or less angiogenic neoplasms, therefore it should not be used to \"exclude\" DCIS or lobular neoplasia. If there are suspicious calcifications or worrisome palpable masses, then biopsy should still be considered based on clinical assessment. 5/11/22-Breast, right lumpectomy by Dr. Farhan Osborn at [facility]: Infiltrating ductal carcinoma, no special type, grade 1. Infiltrating carcinoma measures 0.6 cm in greatest dimension. Infiltrating carcinoma extends to within 0.7 cm of the nearest lateral surgical excision margin. Low-grade ductal carcinoma in situ is identified in conjunction with the invasive lesion. In situ carcinoma is located 0.7 cm from the nearest lateral surgical excision margin. Breast, excision of additional right breast caudal margin: Benign breast parenchyma with changes consistent with fibrocystic mastopathy. Breast, excision of additional right breast medial margin: Benign breast parenchyma with changes consistent with fibrocystic mastopathy and mild ductal hyperplasia of the usual type. Breast, excision of additional right breast deep margin: Benign breast with changes consistent with fibrocystic mastopathy. Breast, excision of additional right breast lateral margin: Benign breast with changes consistent with fibrocystic mastopathy. Lymph node, right sentinel lymph node biopsy: 3 lymph nodes, negative for evidence of malignancy.  AJCC STAGE: pT1b, (sn)pN0, pMx   5/11/22 IORT 2000 cGy to right breast   5/12/22 Initiated endocrine hormone therapy with Tamoxifen 20mg daily  6/7/2022-she was first seen by Dr Miguelito Reynoso. She is status post right lumpectomy/IORT 2000 cGy. Recommended adjuvant tamoxifen x5 years. Patient is perimenopausal.  Low clinical risk. MammaPrint luminal type A. Therefore I did not recommend adjuvant chemotherapy. She has very good prognosis. 11/23/2022 Right breast cancer- BI-RADS 4, suspicious with recommendation for biopsy. Recommendation is stereotactic biopsy for the calcifications at 10:00 anterior depth. Age-appropriate health screening:    Past Medical History:    Past Medical History:   Diagnosis Date    Breast lump        Past Surgical History:    Past Surgical History:   Procedure Laterality Date    BREAST BIOPSY Left 2008    Benign    BREAST LUMPECTOMY Right 5/12/2022    RIGHT LUMPECTOMY AND SN, PREOP US,INTRAOP US GUIDED NL, PEC BLOCK, BIOZORB, FLAPS, MARGIN PROBE, IORT performed by Coreen Grace MD at [facility] [address] Right 4/14/2022    US BREAST NEEDLE BIOPSY RIGHT 4/14/2022 [facility] [address]       Current Medications:    Current Outpatient Medications   Medication Sig Dispense Refill    diazePAM (VALIUM) 5 MG tablet Take 1 to 2 tablets by mouth 1 hour prior to procedure. May repeat. May also take one q 8 hours as needed for anxiety 20 tablet 0    busPIRone (BUSPAR) 5 MG tablet TAKE 1 TABLET BY MOUTH THREE TIMES DAILY 90 tablet 0    tamoxifen (NOLVADEX) 20 MG tablet Take 1 tablet by mouth daily 30 tablet 5    HYDROcodone-acetaminophen (NORCO) 5-325 MG per tablet Take 1 tablet by mouth every 6 hours as needed for Pain. 12 tablet 0    cephALEXin (KEFLEX) 500 MG capsule Take 1 capsule by mouth 3 times daily 30 capsule 0    montelukast (SINGULAIR) 10 MG tablet Take 10 mg by mouth nightly      mupirocin (BACTROBAN NASAL) 2 % nasal ointment Apply to each nostril BID 5 days prior to surgery 1 each 0     No current facility-administered medications for this visit.         Allergies: Allergies   Allergen Reactions    Monistat [Miconazole] Other (See Comments)     Extreme burning sensation    Naproxen Rash     Facial rash and nausea       Social History:    Social History     Tobacco Use    Smoking status: Former     Types: Cigarettes    Smokeless tobacco: Never   Substance Use Topics    Alcohol use: Yes     Comment: occ. Family History:   Family History   Problem Relation Age of Onset    Prostate Cancer Father 72    Cancer Father 72        Prostate       Vitals:  Vitals:    12/07/22 0852   BP: 122/74   Site: Left Upper Arm   Position: Sitting   Pulse: (!) 117   SpO2: 98%   Weight: 216 lb 6.4 oz (98.2 kg)   Height: 6' (1.829 m)        Subjective   REVIEW OF SYSTEMS:   Review of Systems   Constitutional: Negative. Negative for chills, diaphoresis and fever. HENT: Negative. Negative for congestion, ear pain, hearing loss, nosebleeds, sore throat and tinnitus. Eyes: Negative. Negative for pain, discharge and redness. Respiratory: Negative. Negative for cough, shortness of breath and wheezing. Cardiovascular: Negative. Negative for chest pain, palpitations and leg swelling. Gastrointestinal: Negative. Negative for abdominal pain, blood in stool, constipation, diarrhea, nausea and vomiting. Endocrine: Positive for heat intolerance. Negative for polydipsia. Genitourinary:  Negative for dysuria, flank pain, frequency, hematuria and urgency. Musculoskeletal:  Positive for arthralgias. Negative for back pain, myalgias and neck pain. Skin: Negative. Negative for rash. Neurological: Negative. Negative for dizziness, tremors, seizures, weakness and headaches. Hematological:  Does not bruise/bleed easily. Psychiatric/Behavioral:  Positive for dysphoric mood. The patient is nervous/anxious. Mood swings     Objective   PHYSICAL EXAM:  Physical Exam  Vitals reviewed. Constitutional:       General: She is not in acute distress.      Appearance: She is well-developed. HENT:      Head: Normocephalic and atraumatic. Mouth/Throat:      Pharynx: Uvula midline. Tonsils: No tonsillar exudate. Eyes:      General: Lids are normal.      Conjunctiva/sclera: Conjunctivae normal.      Pupils: Pupils are equal, round, and reactive to light. Neck:      Thyroid: No thyroid mass or thyromegaly. Vascular: No JVD. Trachea: Trachea normal. No tracheal deviation. Cardiovascular:      Rate and Rhythm: Normal rate and regular rhythm. Pulses: Normal pulses. Heart sounds: Normal heart sounds. Pulmonary:      Effort: Pulmonary effort is normal. No respiratory distress. Breath sounds: Normal breath sounds. No wheezing or rales. Chest:      Chest wall: No tenderness. Abdominal:      General: Bowel sounds are normal. There is no distension. Palpations: Abdomen is soft. There is no mass. Tenderness: There is no abdominal tenderness. There is no guarding. Musculoskeletal:         General: No tenderness or deformity. Cervical back: Normal range of motion and neck supple. Comments: Range of motion within normal limits x4 extremities   Skin:     General: Skin is warm. Findings: No bruising, erythema or rash. Neurological:      Mental Status: She is alert and oriented to person, place, and time. Cranial Nerves: No cranial nerve deficit. Coordination: Coordination normal.   Psychiatric:         Behavior: Behavior normal.         Thought Content: Thought content normal.       Labs reviewed today:  Lab Results   Component Value Date    WBC 5.20 12/07/2022    HGB 13.9 12/07/2022    HCT 41.5 12/07/2022    MCV 92.0 12/07/2022     (L) 12/07/2022     Lab Results   Component Value Date    NEUTROABS 3.24 12/07/2022       ASSESSMENT/PLAN:      1. Invasive ductal carcinoma of the right breast, ER/GA positive, HER2/loco negative, Stage I pT1b (sn)N0 cM0, grade 1, very early stage breast cancer.   Current recommendation is for adjuvant endocrine therapy with tamoxifen 20 mg daily, for 5 to 10 years. Reports compliant with tamoxifen. Jose Martinez was seen in scheduled follow-up by Dr Jo Ann Abbott on 06/21/2022, I reviewed the progress note from that office visit which documented breast exam with no new findings or concerns of recurrence. Note states wounds healing well without any signs of infection or necrosis. 11/23/2022 Right mammogram- BI-RADS 4, suspicious with recommendation for biopsy. Recommendation is stereotactic biopsy for the calcifications at 10:00 anterior depth. Biopsy schedule on 12/13/2022 per Dr Jo Ann Abbott. She is very nervous and anxious related to the upcoming biopsy.    -Continue tamoxifen 20 mg p.o. daily, continue adjuvant therapy to prevent progression of breast cancer  -Keep scheduled appointment for biopsy on 12/13/2022 and follow-up with Dr. Jo Ann Abbott on 12/28/2022  -at 4.5-year rahul will consider obtaining breast cancer index study to see if any benefit would be from an additional 5 years of adjuvant endocrine therapy. Labs to be obtained today for further evaluation:  - Comprehensive Metabolic Panel; Future  - Cancer Antigen 27.29; Future  - CEA; Future  - Cancer Antigen 15-3; Future    2. Side effects of adjuvant endocrine therapy  Hot flashes-occur mostly at bedtime. Mood swings-have intensified, currently taking BuSpar 5 mg 3 times a day and Valium to help with sleeping  Reports last menstrual cycle was in July 2022 and this past October spotted for approximately a week although no actual bleeding. 3. Iron deficiency anemia, hemoglobin 13.9, hematocrit 41.5 and MCV 92. Complains of random leg cramps at night and increased fatigue. Reports has taking iron replacement in the past.    - Iron and TIBC; Future  - Ferritin; Future    4. Care plan discussed with patient  Adjuvant endocrine therapy, await biopsy results continue with routine screening and monitoring    5.   Survivorship and health maintenance recommendations  Keep a healthy body weight. Dietary recommendations include limit energy intake from total fats and sugars; increase consumption of fruit and vegetables, as well as legumes, whole grains and nuts. Engage in regular physical activity (150 minutes spread through the week). Other recommendations include minimizing alcohol intake, avoid use of tobacco products. Practice sun safety: Utilize a sunscreen with an SPF of at least 27. Avoiding tanning beds. Ensure adequate amount of sleep. Follow-up with primary care regularly and for further recommendations regarding age-appropriate screening for cancer, wellbeing visit (preventive measures), follow-up and treatment for other medical comorbidities. I discussed all of the above findings included in the assessment and plan with the patient and the patient is in agreement to move forward with current recommendations/treatment. I have addressed all of their questions and concerns that were verbalized. FOLLOW UP:  Follow-up appointment made for 3 months, or sooner, if needed  Continue to follow with other medical providers as recommended  Labs at next visit: [facility]    EMR Dragon/Transcription disclaimer:   Much of this encounter note is an electronic transcription/translation of spoken language to printed text. The electronic translation of spoken language may permit erroneous, or at times, nonsensical words or phrases to be inadvertently transcribed; although attempts have made to review the note for such errors, some may still exist.  Please excuse any unrecognized transcription errors and contact us if the error is unintelligible or needs documented correction. Also, portions of this note have been copied forward, however, changed to reflect the most current clinical status of this patient.     Electronically signed by MATILDE Gardiner on 12/11/2022 at 7:25 PM   Terrell BLACK am pre-charting as a registered nurse for Savita Wall, APRN.

## 2022-12-07 ENCOUNTER — OFFICE VISIT (OUTPATIENT)
Dept: HEMATOLOGY | Age: 52
End: 2022-12-07
Payer: COMMERCIAL

## 2022-12-07 ENCOUNTER — HOSPITAL ENCOUNTER (OUTPATIENT)
Dept: INFUSION THERAPY | Age: 52
Discharge: HOME | End: 2022-12-07
Payer: COMMERCIAL

## 2022-12-07 VITALS
SYSTOLIC BLOOD PRESSURE: 122 MMHG | OXYGEN SATURATION: 98 % | DIASTOLIC BLOOD PRESSURE: 74 MMHG | HEIGHT: 72 IN | HEART RATE: 117 BPM | WEIGHT: 216.4 LBS | BODY MASS INDEX: 29.31 KG/M2

## 2022-12-07 DIAGNOSIS — D50.9 IRON DEFICIENCY ANEMIA, UNSPECIFIED IRON DEFICIENCY ANEMIA TYPE: ICD-10-CM

## 2022-12-07 DIAGNOSIS — C50.811 MALIGNANT NEOPLASM OF OVERLAPPING SITES OF RIGHT BREAST IN FEMALE, ESTROGEN RECEPTOR POSITIVE (HCC): ICD-10-CM

## 2022-12-07 DIAGNOSIS — Z17.0 MALIGNANT NEOPLASM OF OVERLAPPING SITES OF RIGHT BREAST IN FEMALE, ESTROGEN RECEPTOR POSITIVE (HCC): Primary | ICD-10-CM

## 2022-12-07 DIAGNOSIS — Z17.0 MALIGNANT NEOPLASM OF OVERLAPPING SITES OF RIGHT BREAST IN FEMALE, ESTROGEN RECEPTOR POSITIVE (HCC): ICD-10-CM

## 2022-12-07 DIAGNOSIS — Z51.81 ENCOUNTER FOR MONITORING ADJUVANT HORMONAL THERAPY: ICD-10-CM

## 2022-12-07 DIAGNOSIS — Z79.899 ENCOUNTER FOR MONITORING ADJUVANT HORMONAL THERAPY: ICD-10-CM

## 2022-12-07 DIAGNOSIS — C50.811 MALIGNANT NEOPLASM OF OVERLAPPING SITES OF RIGHT BREAST IN FEMALE, ESTROGEN RECEPTOR POSITIVE (HCC): Primary | ICD-10-CM

## 2022-12-07 DIAGNOSIS — T45.1X5A HOT FLASHES DUE TO TAMOXIFEN: ICD-10-CM

## 2022-12-07 DIAGNOSIS — R23.2 HOT FLASHES DUE TO TAMOXIFEN: ICD-10-CM

## 2022-12-07 DIAGNOSIS — Z71.89 CARE PLAN DISCUSSED WITH PATIENT: ICD-10-CM

## 2022-12-07 LAB
ALBUMIN SERPL-MCNC: 4.3 G/DL (ref 3.5–5.2)
ALP BLD-CCNC: 65 U/L (ref 35–104)
ALT SERPL-CCNC: 58 U/L (ref 9–52)
ANION GAP SERPL CALCULATED.3IONS-SCNC: 7 MMOL/L (ref 7–19)
AST SERPL-CCNC: 36 U/L (ref 14–36)
BASOPHILS ABSOLUTE: 0.03 K/UL (ref 0.01–0.08)
BASOPHILS RELATIVE PERCENT: 0.6 % (ref 0.1–1.2)
BILIRUB SERPL-MCNC: 0.5 MG/DL (ref 0.2–1.3)
BUN BLDV-MCNC: 12 MG/DL (ref 7–17)
CA 15-3: 18 U/ML (ref 0–25)
CALCIUM SERPL-MCNC: 9.1 MG/DL (ref 8.4–10.2)
CEA: 1.4 NG/ML (ref 0–4.7)
CHLORIDE BLD-SCNC: 108 MMOL/L (ref 98–111)
CO2: 23 MMOL/L (ref 22–29)
CREAT SERPL-MCNC: 0.9 MG/DL (ref 0.5–1)
EOSINOPHILS ABSOLUTE: 0.1 K/UL (ref 0.04–0.54)
EOSINOPHILS RELATIVE PERCENT: 1.9 % (ref 0.7–7)
FERRITIN: 56.3 NG/ML (ref 13–150)
GFR SERPL CREATININE-BSD FRML MDRD: >60 ML/MIN/{1.73_M2}
GLOBULIN: 2.9 G/DL
GLUCOSE BLD-MCNC: 111 MG/DL (ref 74–106)
HCT VFR BLD CALC: 41.5 % (ref 34.1–44.9)
HEMOGLOBIN: 13.9 G/DL (ref 11.2–15.7)
IRON SATURATION: 26 % (ref 14–50)
IRON: 71 UG/DL (ref 37–145)
LYMPHOCYTES ABSOLUTE: 1.25 K/UL (ref 1.18–3.74)
LYMPHOCYTES RELATIVE PERCENT: 24 % (ref 19.3–53.1)
MCH RBC QN AUTO: 30.8 PG (ref 25.6–32.2)
MCHC RBC AUTO-ENTMCNC: 33.5 G/DL (ref 32.3–35.5)
MCV RBC AUTO: 92 FL (ref 79.4–94.8)
MONOCYTES ABSOLUTE: 0.56 K/UL (ref 0.24–0.82)
MONOCYTES RELATIVE PERCENT: 10.8 % (ref 4.7–12.5)
NEUTROPHILS ABSOLUTE: 3.24 K/UL (ref 1.56–6.13)
NEUTROPHILS RELATIVE PERCENT: 62.3 % (ref 34–71.1)
PDW BLD-RTO: 13 % (ref 11.7–14.4)
PLATELET # BLD: 177 K/UL (ref 182–369)
PMV BLD AUTO: 10.3 FL (ref 7.4–10.4)
POTASSIUM SERPL-SCNC: 3.7 MMOL/L (ref 3.5–5.1)
RBC # BLD: 4.51 M/UL (ref 3.93–5.22)
SODIUM BLD-SCNC: 138 MMOL/L (ref 137–145)
TOTAL IRON BINDING CAPACITY: 271 UG/DL (ref 250–400)
TOTAL PROTEIN: 7.2 G/DL (ref 6.3–8.2)
WBC # BLD: 5.2 K/UL (ref 3.98–10.04)

## 2022-12-07 PROCEDURE — 99212 OFFICE O/P EST SF 10 MIN: CPT

## 2022-12-07 PROCEDURE — 36415 COLL VENOUS BLD VENIPUNCTURE: CPT | Performed by: NURSE PRACTITIONER

## 2022-12-07 PROCEDURE — 80053 COMPREHEN METABOLIC PANEL: CPT

## 2022-12-07 PROCEDURE — 99214 OFFICE O/P EST MOD 30 MIN: CPT | Performed by: NURSE PRACTITIONER

## 2022-12-07 PROCEDURE — 85025 COMPLETE CBC W/AUTO DIFF WBC: CPT

## 2022-12-07 PROCEDURE — 36415 COLL VENOUS BLD VENIPUNCTURE: CPT

## 2022-12-08 LAB — CA 27.29: 17.1 U/ML

## 2022-12-11 ASSESSMENT — ENCOUNTER SYMPTOMS
EYE DISCHARGE: 0
SHORTNESS OF BREATH: 0
DIARRHEA: 0
SORE THROAT: 0
BLOOD IN STOOL: 0
NAUSEA: 0
EYE PAIN: 0
BACK PAIN: 0
ABDOMINAL PAIN: 0
COUGH: 0
EYE REDNESS: 0
RESPIRATORY NEGATIVE: 1
WHEEZING: 0
EYES NEGATIVE: 1
VOMITING: 0
GASTROINTESTINAL NEGATIVE: 1
CONSTIPATION: 0

## 2022-12-13 ENCOUNTER — HOSPITAL ENCOUNTER (OUTPATIENT)
Dept: WOMENS IMAGING | Age: 52
Discharge: HOME | End: 2022-12-13
Payer: COMMERCIAL

## 2022-12-13 DIAGNOSIS — R92.0 MICROCALCIFICATION OF RIGHT BREAST ON MAMMOGRAM: ICD-10-CM

## 2022-12-13 DIAGNOSIS — R92.1 BREAST CALCIFICATION, RIGHT: ICD-10-CM

## 2022-12-13 PROCEDURE — 77065 DX MAMMO INCL CAD UNI: CPT

## 2022-12-13 PROCEDURE — 88305 TISSUE EXAM BY PATHOLOGIST: CPT

## 2022-12-13 PROCEDURE — 2709999900 MAM STEREO BREAST BX W LOC DEVICE 1ST LESION RIGHT

## 2022-12-13 PROCEDURE — 19081 BX BREAST 1ST LESION STRTCTC: CPT

## 2022-12-15 ENCOUNTER — TELEPHONE (OUTPATIENT)
Dept: SURGERY | Age: 52
End: 2022-12-15

## 2022-12-15 DIAGNOSIS — R92.0 MICROCALCIFICATION OF RIGHT BREAST ON MAMMOGRAM: Primary | ICD-10-CM

## 2022-12-15 NOTE — TELEPHONE ENCOUNTER
Patient returning a called back to office. Patient said thought the voicemail said rescheduled her appointment for 6 months, [facility] did not see a note to rescheduled. Please called the patient .         Thank You

## 2022-12-20 NOTE — TELEPHONE ENCOUNTER
Called and talked to patient and explain to her we will be calling her closer to time for her new appointment

## 2022-12-20 NOTE — TELEPHONE ENCOUNTER
Pt called again, stating she hadn't heard back from office. I was able to find the notes left by [facility] to confirm that pt can return in 6 months since pt has already discussed results with Dr. Addison Navarro. Pt cancelled appt with Dr. Addison Navarro and would like [facility] to place mammogram order for 6 months out. Please contact pt if needed.     Thank you

## 2023-01-19 DIAGNOSIS — C50.811 MALIGNANT NEOPLASM OF OVERLAPPING SITES OF RIGHT BREAST IN FEMALE, ESTROGEN RECEPTOR POSITIVE (HCC): Primary | ICD-10-CM

## 2023-01-19 DIAGNOSIS — Z17.0 MALIGNANT NEOPLASM OF OVERLAPPING SITES OF RIGHT BREAST IN FEMALE, ESTROGEN RECEPTOR POSITIVE (HCC): Primary | ICD-10-CM

## 2023-02-13 ENCOUNTER — HOSPITAL ENCOUNTER (OUTPATIENT)
Dept: OCCUPATIONAL THERAPY | Age: 53
Setting detail: THERAPIES SERIES
Discharge: HOME | End: 2023-02-13
Payer: COMMERCIAL

## 2023-02-13 VITALS — BODY MASS INDEX: 29.26 KG/M2 | HEIGHT: 72 IN | WEIGHT: 216 LBS

## 2023-02-13 PROCEDURE — 97165 OT EVAL LOW COMPLEX 30 MIN: CPT

## 2023-02-13 NOTE — PROGRESS NOTES
Occupational Therapy: Initial Evaluation   Patient: Flakita Michael (28 y.o. female)   Examination Date:   Plan of Care Certification Period: 2023 to  2023      :  1970  MRN: [id number]  CSN: [id number]   Insurance: Payor: Refdrake Altamirano / Plan: [employer]ord Knights - [place] PPO / Product Type: *No Product type* /   Insurance ID: [id number] - ([address] [place] [address]) Secondary Insurance (if applicable): Insurance Information: BCBS   Referring Physician: MD Elio Duval MD   PCP: Arelis Owens DO Visits to Date/Visits Approved:   /      No Show/Cancelled Appts:    /       Medical Diagnosis: Malignant neoplasm of overlapping sites of right female breast [C50.811]  Estrogen receptor positive status (ER+) [Z17.0] C50.811 Malignant neoplasm of overlapping sites of right breast in female, estrogen receptor positive  No data recorded     [facility]   Patient assessed for rehabilitation services?: Yes  Self reported health status[de-identified] Excellent    Medical History: Chart Reviewed: Yes   Past Medical History:   Diagnosis Date    Breast lump      Surgical History:   Past Surgical History:   Procedure Laterality Date    BREAST BIOPSY Left     Benign    BREAST LUMPECTOMY Right 2022    RIGHT LUMPECTOMY AND SN, PREOP US,INTRAOP US GUIDED NL, PEC BLOCK, BIOZORB, FLAPS, MARGIN PROBE, IORT performed by Elio Singh MD at [facility]    [facility] BREAST BIOPSY RIGHT Right 2022    [facility] BREAST BIOPSY RIGHT 2022 [facility] BREAST BIOPSY W LOC DEVICE 1ST LESION RIGHT Right 2022    US BREAST NEEDLE BIOPSY RIGHT 2022 [facility] [address]          SUBJECTIVE EXAMINATION     History obtained from[de-identified] Patient, Chart Review,      Family/Caregiver Present: No    Subjective History: Onset Date: 23       Lymphedema History (if Applicable):  Oncology History (if Applicable):   History of cellulitis: No  Family history of lymphedema: No  Previous lymphedema treament: No History of Cancer and/or Treatment: Yes  Medical Oncologist: Bacilio Lee MD  Cancer Type/Stage: Malignant neoplasm of overlapping of right breast  History of radiation : Yes  Radiation type: IORT  History of chemotherapy: No  History of surgery: Yes  Procedure/Results: right lumpectomy  History of Lymph Node Removal: Yes  Type/Location of Lymph Nodes: right axillary nodes  Number of Lymph Removed: 3               Learning/Language: Learning  Does the patient/guardian have any barriers to learning?: No barriers     Pain Screening    Pain Screening  Patient Currently in Pain: No    Functional Status    Dominant Hand: : Right      OBJECTIVE EXAMINATION       Review of Systems / Circulatory Assessment:  Height and Weight  Height: 6' (182.9 cm)  Weight: 216 lb (98 kg)  BSA (Calculated - sq m): 2.23 sq meters  BMI (Calculated): 29.4      Measurements: Circumferential Limb Measurements   Limb Measurements Assessed: UE  Left Measurements Right Measurements   Left UE Circumferential Measurements (cm)   Patient Position: Seated  Dominent Hand: Right  Height: 6' (182.9 cm)  Weight: 216 lb (98 kg)  BMI (Calculated): 29.29  Reference for Starting Point: 11 cm measuring proximally from most distal point on 3rd digit; first measurement begins near MCPs, then every 5 cm after  0 (Starting Measurement): 18.5  (+ 4 cm): 20.6  (+8 cm): 17.3  (+12 cm): 19.3  (+16 cm): 22.5  (+20 cm): 25.5  (+24 cm): 27.5  (+28 cm): 29.5  (+32 cm): 31.3  (+36 cm): 33  (+40 cm): 35.7  (+44 cm): 36.3  Total Girth - Left UE: 317  Left LE Circumferential Measurements (cm)   Height: 6' (182.9 cm)  Weight: 216 lb (98 kg)  BMI (Calculated): 29.29 Right UE Circumferential Measurements (cm)   Patient Position: Seated  Dominent Hand: Right  Height: 6' (182.9 cm)  Weight: 216 lb (98 kg)  BMI (Calculated): 29.29  Reference for Starting Point: 11 cm measuring proximally from most distal point on 3rd digit; first measurement begins near MCPs, then every 5 cm after  0 (Starting Measurement): 18.6  (+ 4 cm): 20.8  (+8 cm): 16.6  (+12 cm): 18.5  (+16 cm): 21.8  (+20 cm): 25.5  (+24 cm): 27.5  (+28 cm): 28.4  (+32 cm): 30.9  (+36 cm): 32.7  (+40 cm): 35  (+44 cm): 37  Total Girth - Right UE: 313.3  Right LE Circumferential Measurements (cm)   Height: 6' (182.9 cm)  Weight: 216 lb (98 kg)  BMI (Calculated): 29.29   Circumferential Limb Measurements Summary  Involved Limb : R UE  Uninvolved Limb : L UE  Total Girth - Involved Limb (cm): 313.3  Total Girth - Uninvolved Limb (cm): 317  Girth Difference (cm): -3.7  Percentage Difference (%): -1.17       ASSESSMENT     Stage of Lymphedema: Stage 0: Latency Stage/Lymphangiopathy (Pre-stage/Subclinical Stage)  Description: Stage 0 Latency: Transport capacity is \"sub-normal\" at present time, but still sufficient enough to manage the normal amount of lymphatic load. Lymphatic load could increase- placing strain on remaining, functioning lymph collectors. Over time collectors may fatigue from increased strain. This fatigue can lead to a further decline in transport capacity placing the patient at risk of developing lymphedema. Impression: Assessment: Patient participated in [facility] evaluation for lymphedema monitoring program this date s/p RUE breast lumpectomy on 5/12/2022. L-dex score 1.0 this date (within normal range) and BUE circumference measurement's taken (WNL). Based on measurements patient does not demonstrate lymphedema development at this time. Patient educated on programs purpose/benefits, anatomy/physiology of lymphedema, and signs/symptoms of lymphedema. Educated patient on RUE precautions of no needlesticks and BP readings being performed on effected UE. Patient verbalizes good understanding. Patient does not report any RUE swelling, discomfort, or tightness.  All patient questions answered and patient discharged from monitoring program after this visits d/t not developing s/s of lymphedema within alloted appts. Educated patient to obtain a new referral back to lymphedema clinic from referring physician if she should begin to develop symptoms. L-Dex® Analysis for Lymphedema    The patient had a 3 month f/u SOZO measurement which I reviewed today. The score is within normal limits at 1.0. Bioimpedance spectroscopy helps identify the onset of lymphedema in an arm or leg before patients experience noticeable swelling. Research has shown that 92% of patients with early detection of lymphedema using L-Dex combined with intervention do not progress to chronic lymphedema. Whenever possible, patients are tested for baseline L-Dex score before cancer treatment begins and then are reassessed during regular follow-up visits using the SOZO device. Otherwise, this can be started postoperatively and continued during regular follow-up visits. If the patients L-Dex score increases above normal levels, that is a sign that lymphedema is developing and a referral is made to physical therapy for further evaluation and early compression treatment. Lymphedema assessment with the SOZO L-Dex score is recommended to be done every 3 months for the first 3 years and then every 6 months for years 4 and 5 followed by annually afterwards. GOALS     Patient Goal(s): Patient goals : No goals identified due to evaluation only. TREATMENT PLAN       REQUIRES OT FOLLOW-UP: No  Additional Comments: Evaluation only.  Patient discharged from monitoring program.       Eval Complexity:   Decision Making: Low Complexity  Exam: L-dex and BUE circumference measurements      Therapy Time  Individual Time In: 1452  Individual Time Out: 1512  Minutes: 20  Timed Code Treatment Minutes: 20 Minutes     Therapist Signature: Asia Jean OT  Electronically signed by VIKTORIA Lam OTR/L, CLT on 2/13/2023 at 3:20 PM    Date: 6/12/0129     I certify that the above Occupational Therapy Services are being furnished while the patient is under my care. I agree with the treatment plan and certify that this therapy is necessary. Physician's Signature:  ___________________________   Date:_______                                                                   Alejandra Zapata MD        Physician Comments: _______________________________________________    Please sign and return to [facility] OCCUPATIONAL THERAPY. Please fax to the location listed below.  Castela Eladia for this referral!    [address] [facility] OCCUPATIONAL THERAPY  [address] [address]  Dept: [address]: [phone]       POC NOTE

## 2023-03-06 NOTE — PROGRESS NOTES
Progress Note      Pt Name: Stephanie Berg: 1970  MRN: [id number]    Date of evaluation: 3/7/2023  History Obtained From:  patient, electronic medical record    CHIEF COMPLAINT:    Chief Complaint   Patient presents with    Follow-up     Malignant neoplasm of overlapping sites of right breast in female, estrogen receptor positive ([address]    Discuss Labs    Other     Side effects of tamoxifen: Hot flashes and mood changes       HISTORY OF PRESENT ILLNESS:    Ya Dennis is a 47 y.o.  female who is currently being followed for invasive ductal carcinoma of the right breast, ER/CO positive and HER2/loco negative, stage I, diagnosed April 2022. Current recommendation is for adjuvant endocrine therapy with tamoxifen 20 mg p.o. daily for anticipated 10 years, through May 2032. Nneka Donnelly presents today in scheduled follow-up for evaluation, side effect monitoring, lab monitoring and further treatment recommendations. She reported being compliant with tamoxifen and has no new breast complaints. Today's clinic visit to include physical assessment, review of systems, any lab or radiographic findings that were available and plan of care are documented below. ONCOLOGIC HISTORY:     Diagnosis  Invasive ductal carcinoma, right breast, April 2022  ER 96%, CO 97%, HER-2 negative, Ki67 3%  MammaPrint-Luminal A/low risk  Stage I  pT1b, (sn)pN0, pMx      Treatment Summary  5/11/22 Right breast lumpectomy with negative SLNB by Dr Limon   5/11/22 IORT 2000 cGy to right breast   5/12/22 Initiated endocrine hormone therapy with Tamoxifen 20mg daily, anticipate 10-year dosing through May 2032     Cancer History  Alberto Ulloa was first seen by Dr Naa Song on 6/7/2022. She was referred by Dr. Shalonda Paz for a diagnosis of stage I breast cancer. This was an screening detected lesion.   3/29/22 Bilateral screening mammogram [facility]): There is an area of increased density with possible distortion toward 11-12 o'clock right the right breast. Further evaluation is recommended with ultrasound. 4/6/22 US right breast [facility]): There is an area of abnormal density, with distortion superiorly in the right breast on the previous mammogram. This could be due to fibrocystic change or neoplastic disease and tomographic biopsy is recommended for further evaluation of this suspicious abnormality. 4/14/22 Right breast mass at 12:00, ultrasound-guided core needle biopsies:  Invasive carcinoma no special type (ductal). Histologic grade (Jennifer histologic score): Glandular (acinar)/tubular differentiation: Score 1. Nuclear pleomorphism: Score 2. Mitotic rate: Score 1. Overall grade: Grade 1. Maximum tumor diameter is at least 0.5 cm. ER 96%, ID 97%, HER-2 negative, Ki67 3%, MammaPrint-Luminal A/low risk. 4/14/22 Diagnostic right breast mammogram: Focus of irregular nodularity and architectural distortion within the upper outer quadrant of the right breast 12 to 1:00 position mid depth. This was surgically biopsied under ultrasound guidance and is highly suspicious for a primary breast neoplasm based on both mammographic imaging and ultrasound. There are associated indeterminate calcifications associated with this lesion. An additional focus of indeterminate calcifications are noted more anteriorly within the upper outer quadrant of the right breast at the 9 to 10:00 position. ACR BI-RADS Category 5-highly suspicious for primary breast neoplasm. Biopsy has been performed under ultrasound guidance. 4/25/22 MRI bilateral breast: Abnormal MRI of the right breast, with a spiculated mass and biopsy proven malignancy demonstrated at 12:00, measuring approximately 1.3 x 0.9 x 2.3 cm, approximately 5.2 cm from the nipple. There is mild reactive enhancement around the mass likely related to the biopsy. No evidence of internal mammary or axillary lymphadenopathy.  No additional mass or abnormal enhancement is identified within either breast. BI-RADS Category 6, known malignancy. Definitive treatment is recommended. Note: A negative contrast-enhanced breast MRI has a high sensitivity for detecting and excluding invasive ductal carcinoma 5mm and larger; however, appropriate clinical and mammographic follow-up are always recommended. Breast MRI may not detect some cases of DCIS or less angiogenic neoplasms, therefore it should not be used to \"exclude\" DCIS or lobular neoplasia. If there are suspicious calcifications or worrisome palpable masses, then biopsy should still be considered based on clinical assessment. 5/11/22-Breast, right lumpect[facility] by Dr. Justa Russ at [facility]: Infiltrating ductal carcinoma, no special type, grade 1. Infiltrating carcinoma measures 0.6 cm in greatest dimension. Infiltrating carcinoma extends to within 0.7 cm of the nearest lateral surgical excision margin. Low-grade ductal carcinoma in situ is identified in conjunction with the invasive lesion. In situ carcinoma is located 0.7 cm from the nearest lateral surgical excision margin. Breast, excision of additional right breast caudal margin: Benign breast parenchyma with changes consistent with fibrocystic mastopathy. Breast, excision of additional right breast medial margin: Benign breast parenchyma with changes consistent with fibrocystic mastopathy and mild ductal hyperplasia of the usual type. Breast, excision of additional right breast deep margin: Benign breast with changes consistent with fibrocystic mastopathy. Breast, excision of additional right breast lateral margin: Benign breast with changes consistent with fibrocystic mastopathy. Lymph node, right sentinel lymph node biopsy: 3 lymph nodes, negative for evidence of malignancy. AJCC STAGE: pT1b, (sn)pN0, pMx   5/11/22 IORT 2000 cGy to right breast   5/12/22 Initiated endocrine hormone therapy with Tamoxifen 20mg daily  6/7/2022-she was first seen by Dr Brandon Baron.   She is status post right lumpectomy/IORT 2000 cGy. Recommended adjuvant tamoxifen x5 years. Patient is perimenopausal.  Low clinical risk. MammaPrint luminal type A. Therefore I did not recommend adjuvant chemotherapy. She has very good prognosis. 2022 Right breast cancer- BI-RADS 4, suspicious with recommendation for biopsy. Recommendation is stereotactic biopsy for the calcifications at 10:00 anterior depth.  2022 Right breast biopsy- No malignancy identified. Benign ducts, sclerosing adenosis, and fibrocystic change. Microcalcifications are present      Age-appropriate health screenin2023- Pap smear (Dr Anna Marie Weems) normal    Past Medical History:    Past Medical History:   Diagnosis Date    Breast lump        Past Surgical History:    Past Surgical History:   Procedure Laterality Date    BREAST BIOPSY Left 2008    Benign    BREAST LUMPECTOMY Right 2022    RIGHT LUMPECTOMY AND SN, PREOP US,INTRAOP US GUIDED NL, PEC BLOCK, BIOZORB, FLAPS, MARGIN PROBE, IORT performed by Phong Weinstein MD at [address]    [facility] BREAST BIOPSY RIGHT Right 2022    [facility] BREAST BIOPSY RIGHT 2022 [facility]    US BREAST BIOPSY W LOC DEVICE 1ST LESION RIGHT Right 2022    US BREAST NEEDLE BIOPSY RIGHT 2022 [facility]       Current Medications:    Current Outpatient Medications   Medication Sig Dispense Refill    tamoxifen (NOLVADEX) 20 MG tablet Take 1 tablet by mouth daily 90 tablet 3    busPIRone (BUSPAR) 5 MG tablet TAKE 1 TABLET BY MOUTH THREE TIMES DAILY 90 tablet 0    HYDROcodone-acetaminophen (NORCO) 5-325 MG per tablet Take 1 tablet by mouth every 6 hours as needed for Pain.  12 tablet 0    cephALEXin (KEFLEX) 500 MG capsule Take 1 capsule by mouth 3 times daily 30 capsule 0    montelukast (SINGULAIR) 10 MG tablet Take 10 mg by mouth nightly      mupirocin (BACTROBAN NASAL) 2 % nasal ointment Apply to each nostril BID 5 days prior to surgery 1 each 0     No current facility-administered medications for this visit. Allergies: Allergies   Allergen Reactions    Monistat [Miconazole] Other (See Comments)     Extreme burning sensation    Naproxen Rash     Facial rash and nausea       Social History:    Social History     Tobacco Use    Smoking status: Former     Types: Cigarettes    Smokeless tobacco: Never   Substance Use Topics    Alcohol use: Yes     Comment: occ. Family History:   Family History   Problem Relation Age of Onset    Prostate Cancer Father 72    Cancer Father 72        Prostate       Vitals:  Vitals:    03/07/23 0941   BP: 128/88   Pulse: (!) 101   SpO2: 99%   Weight: 216 lb (98 kg)   Height: 6' (1.829 m)          Subjective   REVIEW OF SYSTEMS:   Review of Systems   Constitutional: Negative. Negative for chills, diaphoresis and fever. HENT: Negative. Negative for congestion, ear pain, hearing loss, nosebleeds, sore throat and tinnitus. Eyes: Negative. Negative for pain, discharge and redness. Respiratory: Negative. Negative for cough, shortness of breath and wheezing. Cardiovascular: Negative. Negative for chest pain, palpitations and leg swelling. Gastrointestinal: Negative. Negative for abdominal pain, blood in stool, constipation, diarrhea, nausea and vomiting. Endocrine: Positive for heat intolerance (at night but tolerable). Negative for polydipsia. Genitourinary:  Negative for dysuria, flank pain, frequency, hematuria and urgency. Musculoskeletal:  Positive for arthralgias. Negative for back pain, myalgias and neck pain. Skin: Negative. Negative for rash. Neurological: Negative. Negative for dizziness, tremors, seizures, weakness and headaches. Hematological:  Does not bruise/bleed easily. Psychiatric/Behavioral:  Positive for agitation (some since starting Tamoxifen) and sleep disturbance. The patient is not nervous/anxious. Objective   PHYSICAL EXAM:  Physical Exam  Vitals reviewed.   Constitutional:       General: She is not in acute distress.     Appearance: She is well-developed.   HENT:      Head: Normocephalic and atraumatic.      Mouth/Throat:      Pharynx: Uvula midline.      Tonsils: No tonsillar exudate.   Eyes:      General: Lids are normal.      Conjunctiva/sclera: Conjunctivae normal.      Pupils: Pupils are equal, round, and reactive to light.   Neck:      Thyroid: No thyroid mass or thyromegaly.      Vascular: No JVD.      Trachea: Trachea normal. No tracheal deviation.   Cardiovascular:      Rate and Rhythm: Normal rate and regular rhythm.      Pulses: Normal pulses.      Heart sounds: Normal heart sounds.   Pulmonary:      Effort: Pulmonary effort is normal. No respiratory distress.      Breath sounds: Normal breath sounds. No wheezing or rales.   Chest:      Chest wall: No tenderness.          Comments: Bilateral breast with fibrocystic changes  Abdominal:      General: Bowel sounds are normal. There is no distension.      Palpations: Abdomen is soft. There is no mass.      Tenderness: There is no abdominal tenderness. There is no guarding.   Musculoskeletal:         General: No tenderness or deformity.      Cervical back: Normal range of motion and neck supple.      Comments: Range of motion within normal limits x4 extremities   Skin:     General: Skin is warm.      Findings: No bruising, erythema or rash.   Neurological:      Mental Status: She is alert and oriented to person, place, and time.      Cranial Nerves: No cranial nerve deficit.      Coordination: Coordination normal.   Psychiatric:         Behavior: Behavior normal.         Thought Content: Thought content normal.       Labs reviewed today:  Lab Results   Component Value Date    WBC 3.94 (L) 03/07/2023    HGB 14.9 03/07/2023    HCT 46.8 (H) 03/07/2023    MCV 96.5 (H) 03/07/2023     03/07/2023     Lab Results   Component Value Date    NEUTROABS 1.61 03/07/2023       ASSESSMENT/PLAN:      1.  Invasive ductal  carcinoma of the right breast, ER/NC positive, HER2/loco negative, Stage I pT1b (sn)N0 cM0, grade 1, very early stage breast cancer. Current recommendation is for adjuvant endocrine therapy with tamoxifen 20 mg daily, for 5 to 10 years. Reports compliant with tamoxifen and has no new bilateral breast complaints. She has no radiographic or biopsy-proven progression of disease. CEA of 1.4, CA 15-3 of 18, and CA 27.29 of 17.1 on 12/07/2022 12/13/2022 Right breast biopsy- No malignancy identified. Benign ducts, sclerosing adenosis, and fibrocystic change. Microcalcifications are present    Bilateral breast examination today revealed no dominant masses, no skin or nipple changes and no axillary adenopathy. No suspicious abnormality to suggest recurrent breast cancer. Both breast and fibrocystic changes. Left breast with fatty palpable area at surgical scarring site (12 o'clock position). -Continue tamoxifen 20 mg p.o. daily, continue adjuvant therapy to prevent progression of breast cancer. Electronic prescription of tamoxifen sent  -At 4.5-year rahul (January 2027) will consider obtaining breast cancer index study to see if any benefit would be from an additional 5 years of adjuvant endocrine therapy. 2.  Side effects of adjuvant endocrine therapy  Hot flashes-continue to occur at bedtime, no significant change  Mood swings-have intensified, currently taking BuSpar 5 mg 3 times a day   Reports last menstrual cycle was in July 2022. Normal Pap smear in January 2023. Denies any noted bleeding      3. Iron deficiency anemia, hemoglobin 14.9, hematocrit 46.8 and MCV 96.5. Iron substrates on 12/7/2022 were within normal limits  12/07/2022 Serology results  Iron 71  TIBC 271  Saturation 26%  Ferritin 56.3    -CBC    4. Care plan discussed with patient    5. Survivorship and health maintenance recommendations  Keep a healthy body weight.  Dietary recommendations include limit energy intake from total fats and sugars; increase consumption of fruit and vegetables, as well as legumes, whole grains and nuts. Engage in regular physical activity (150 minutes spread through the week). Other recommendations include minimizing alcohol intake, avoid use of tobacco products. Practice sun safety: Utilize a sunscreen with an SPF of at least 27. Avoiding tanning beds. Ensure adequate amount of sleep. Follow-up with primary care regularly and for further recommendations regarding age-appropriate screening for cancer, wellbeing visit (preventive measures), follow-up and treatment for other medical comorbidities. I discussed all of the above findings included in the assessment and plan with the patient and the patient is in agreement to move forward with current recommendations/treatment. I have addressed all of their questions and concerns that were verbalized. FOLLOW UP:  Follow-up appointment made for 3 months, or sooner, if needed  Continue to follow with other medical providers as recommended  Labs at next visit: CBC, CEA, CA 15-3, Ca 27.29, CMP    EMR Dragon/Transcription disclaimer:   Much of this encounter note is an electronic transcription/translation of spoken language to printed text. The electronic translation of spoken language may permit erroneous, or at times, nonsensical words or phrases to be inadvertently transcribed; although attempts have made to review the note for such errors, some may still exist.  Please excuse any unrecognized transcription errors and contact us if the error is unintelligible or needs documented correction. Also, portions of this note have been copied forward, however, changed to reflect the most current clinical status of this patient. Electronically signed by Iver Lombard, APRN on 3/11/2023 at 6:47 PM  Emery BLACK, jeevan starting this note as a registered nurse for MATILDE Swanson.

## 2023-03-07 ENCOUNTER — HOSPITAL ENCOUNTER (OUTPATIENT)
Dept: INFUSION THERAPY | Age: 53
Discharge: HOME | End: 2023-03-07
Payer: COMMERCIAL

## 2023-03-07 ENCOUNTER — OFFICE VISIT (OUTPATIENT)
Dept: HEMATOLOGY | Age: 53
End: 2023-03-07

## 2023-03-07 VITALS
BODY MASS INDEX: 29.26 KG/M2 | OXYGEN SATURATION: 99 % | WEIGHT: 216 LBS | SYSTOLIC BLOOD PRESSURE: 128 MMHG | HEIGHT: 72 IN | DIASTOLIC BLOOD PRESSURE: 88 MMHG | HEART RATE: 101 BPM

## 2023-03-07 DIAGNOSIS — Z17.0 MALIGNANT NEOPLASM OF OVERLAPPING SITES OF RIGHT BREAST IN FEMALE, ESTROGEN RECEPTOR POSITIVE (HCC): ICD-10-CM

## 2023-03-07 DIAGNOSIS — Z79.899 ENCOUNTER FOR MONITORING ADJUVANT HORMONAL THERAPY: ICD-10-CM

## 2023-03-07 DIAGNOSIS — C50.911 ER+ PR+ CARCINOMA OF BREAST, RIGHT (HCC): ICD-10-CM

## 2023-03-07 DIAGNOSIS — C50.811 MALIGNANT NEOPLASM OF OVERLAPPING SITES OF RIGHT BREAST IN FEMALE, ESTROGEN RECEPTOR POSITIVE (HCC): ICD-10-CM

## 2023-03-07 DIAGNOSIS — Z51.81 ENCOUNTER FOR MONITORING ADJUVANT HORMONAL THERAPY: ICD-10-CM

## 2023-03-07 DIAGNOSIS — R23.2 HOT FLASHES DUE TO TAMOXIFEN: ICD-10-CM

## 2023-03-07 DIAGNOSIS — T45.1X5A HOT FLASHES DUE TO TAMOXIFEN: ICD-10-CM

## 2023-03-07 DIAGNOSIS — Z71.89 CARE PLAN DISCUSSED WITH PATIENT: Primary | ICD-10-CM

## 2023-03-07 DIAGNOSIS — Z17.0 ER+ PR+ CARCINOMA OF BREAST, RIGHT (HCC): ICD-10-CM

## 2023-03-07 LAB
BASOPHILS ABSOLUTE: 0.03 K/UL (ref 0.01–0.08)
BASOPHILS RELATIVE PERCENT: 0.8 % (ref 0.1–1.2)
EOSINOPHILS ABSOLUTE: 0.13 K/UL (ref 0.04–0.54)
EOSINOPHILS RELATIVE PERCENT: 3.3 % (ref 0.7–7)
HCT VFR BLD CALC: 46.8 % (ref 34.1–44.9)
HEMOGLOBIN: 14.9 G/DL (ref 11.2–15.7)
LYMPHOCYTES ABSOLUTE: 1.69 K/UL (ref 1.18–3.74)
LYMPHOCYTES RELATIVE PERCENT: 42.9 % (ref 19.3–53.1)
MCH RBC QN AUTO: 30.7 PG (ref 25.6–32.2)
MCHC RBC AUTO-ENTMCNC: 31.8 G/DL (ref 32.3–35.5)
MCV RBC AUTO: 96.5 FL (ref 79.4–94.8)
MONOCYTES ABSOLUTE: 0.47 K/UL (ref 0.24–0.82)
MONOCYTES RELATIVE PERCENT: 11.9 % (ref 4.7–12.5)
NEUTROPHILS ABSOLUTE: 1.61 K/UL (ref 1.56–6.13)
NEUTROPHILS RELATIVE PERCENT: 40.8 % (ref 34–71.1)
PDW BLD-RTO: 12.7 % (ref 11.7–14.4)
PLATELET # BLD: 192 K/UL (ref 182–369)
PMV BLD AUTO: 10.8 FL (ref 7.4–10.4)
RBC # BLD: 4.85 M/UL (ref 3.93–5.22)
WBC # BLD: 3.94 K/UL (ref 3.98–10.04)

## 2023-03-07 PROCEDURE — 36415 COLL VENOUS BLD VENIPUNCTURE: CPT

## 2023-03-07 PROCEDURE — 99211 OFF/OP EST MAY X REQ PHY/QHP: CPT

## 2023-03-07 PROCEDURE — 85025 COMPLETE CBC W/AUTO DIFF WBC: CPT

## 2023-03-07 RX ORDER — TAMOXIFEN CITRATE 20 MG/1
20 TABLET ORAL DAILY
Qty: 90 TABLET | Refills: 3 | Status: SHIPPED | OUTPATIENT
Start: 2023-03-07

## 2023-03-11 ASSESSMENT — ENCOUNTER SYMPTOMS
CONSTIPATION: 0
DIARRHEA: 0
ABDOMINAL PAIN: 0
RESPIRATORY NEGATIVE: 1
BLOOD IN STOOL: 0
EYE REDNESS: 0
BACK PAIN: 0
SORE THROAT: 0
GASTROINTESTINAL NEGATIVE: 1
VOMITING: 0
EYE DISCHARGE: 0
COUGH: 0
NAUSEA: 0
WHEEZING: 0
EYE PAIN: 0
EYES NEGATIVE: 1
SHORTNESS OF BREATH: 0

## 2023-04-19 DIAGNOSIS — Z17.0 ER+ PR+ CARCINOMA OF BREAST, RIGHT (HCC): ICD-10-CM

## 2023-04-19 DIAGNOSIS — C50.911 ER+ PR+ CARCINOMA OF BREAST, RIGHT (HCC): ICD-10-CM

## 2023-04-19 DIAGNOSIS — C50.811 MALIGNANT NEOPLASM OF OVERLAPPING SITES OF RIGHT BREAST IN FEMALE, ESTROGEN RECEPTOR POSITIVE (HCC): ICD-10-CM

## 2023-04-19 DIAGNOSIS — Z17.0 MALIGNANT NEOPLASM OF OVERLAPPING SITES OF RIGHT BREAST IN FEMALE, ESTROGEN RECEPTOR POSITIVE (HCC): ICD-10-CM

## 2023-04-19 RX ORDER — TAMOXIFEN CITRATE 20 MG/1
20 TABLET ORAL DAILY
Qty: 90 TABLET | Refills: 3 | Status: SHIPPED | OUTPATIENT
Start: 2023-04-19

## 2023-06-07 ENCOUNTER — HOSPITAL ENCOUNTER (OUTPATIENT)
Dept: INFUSION THERAPY | Age: 53
Discharge: HOME | End: 2023-06-07
Payer: COMMERCIAL

## 2023-06-07 DIAGNOSIS — C50.811 MALIGNANT NEOPLASM OF OVERLAPPING SITES OF RIGHT BREAST IN FEMALE, ESTROGEN RECEPTOR POSITIVE (HCC): ICD-10-CM

## 2023-06-07 DIAGNOSIS — Z17.0 MALIGNANT NEOPLASM OF OVERLAPPING SITES OF RIGHT BREAST IN FEMALE, ESTROGEN RECEPTOR POSITIVE (HCC): ICD-10-CM

## 2023-06-07 LAB
BASOPHILS # BLD: 0.06 K/UL (ref 0.01–0.08)
BASOPHILS NFR BLD: 1.2 % (ref 0.1–1.2)
EOSINOPHIL # BLD: 0.42 K/UL (ref 0.04–0.54)
EOSINOPHIL NFR BLD: 8.7 % (ref 0.7–7)
ERYTHROCYTE [DISTWIDTH] IN BLOOD BY AUTOMATED COUNT: 13.4 % (ref 11.7–14.4)
HCT VFR BLD AUTO: 45.1 % (ref 34.1–44.9)
HGB BLD-MCNC: 14.1 G/DL (ref 11.2–15.7)
LYMPHOCYTES # BLD: 1.77 K/UL (ref 1.18–3.74)
LYMPHOCYTES NFR BLD: 36.6 % (ref 19.3–53.1)
MCH RBC QN AUTO: 30.5 PG (ref 25.6–32.2)
MCHC RBC AUTO-ENTMCNC: 31.3 G/DL (ref 32.3–35.5)
MCV RBC AUTO: 97.4 FL (ref 79.4–94.8)
MONOCYTES # BLD: 0.47 K/UL (ref 0.24–0.82)
MONOCYTES NFR BLD: 9.7 % (ref 4.7–12.5)
NEUTROPHILS # BLD: 2.04 K/UL (ref 1.56–6.13)
NEUTS SEG NFR BLD: 42.1 % (ref 34–71.1)
PLATELET # BLD AUTO: 182 K/UL (ref 182–369)
PMV BLD AUTO: 10.8 FL (ref 7.4–10.4)
RBC # BLD AUTO: 4.63 M/UL (ref 3.93–5.22)
WBC # BLD AUTO: 4.84 K/UL (ref 3.98–10.04)

## 2023-06-07 PROCEDURE — 85025 COMPLETE CBC W/AUTO DIFF WBC: CPT

## 2023-06-07 PROCEDURE — 36415 COLL VENOUS BLD VENIPUNCTURE: CPT

## 2023-06-07 PROCEDURE — 99211 OFF/OP EST MAY X REQ PHY/QHP: CPT

## 2023-09-01 ENCOUNTER — TELEPHONE (OUTPATIENT)
Dept: HEMATOLOGY | Age: 53
End: 2023-09-01

## 2023-09-01 NOTE — PROGRESS NOTES
Progress Note      Pt Name: Darren Vallejo: 1970  MRN: [id number]    Date of evaluation: 09/06/2023  History Obtained From:  patient, electronic medical record    CHIEF COMPLAINT:    Chief Complaint   Patient presents with    Follow-up    Breast Cancer    Anemia       HISTORY OF PRESENT ILLNESS:    Sahra Shell is a 47 y.o.  female who is currently being followed for invasive ductal carcinoma of the right breast, ER/MI positive and HER2/loco negative, stage I, diagnosed April 2022. Current recommendation is for adjuvant endocrine therapy with tamoxifen 20 mg p.o. daily for anticipated 10 years, through May 2032. Sommer Marrero presents today in scheduled follow-up for evaluation, side effect monitoring, lab monitoring and further treatment recommendations. She reports being compliant with the tamoxifen and has no new breast complaints. She has complaint of increased anxiety and becoming frustrated more easily, is taking BuSpar 5 mg p.o. 3 times daily since July 2022. Today's clinic visit to include physical assessment, review of systems, any lab or radiographic findings that were available and plan of care are documented below. ONCOLOGIC HISTORY:     Diagnosis  Invasive ductal carcinoma, right breast, April 2022  ER 96%, MI 97%, HER-2 negative, Ki67 3%  MammaPrint-Luminal A/low risk  Stage I  pT1b, (sn)pN0, pMx      Treatment Summary  5/11/22 Right breast lumpectomy with negative SLNB by Dr Jose De Jesus Youssef  5/11/22 IORT 2000 cGy to right breast   5/12/22 Initiated endocrine hormone therapy with Tamoxifen 20mg daily, anticipate 10-year dosing through May 2032     Cancer History  Tara Longoria was first seen by Dr Rosalio Norman on 6/7/2022. She was referred by Dr. Tracey Mukherjee for a diagnosis of stage I breast cancer. This was an screening detected lesion.   3/29/22 Bilateral screening mammogram [facility]): There is an area of increased density with possible distortion toward

## 2023-09-06 ENCOUNTER — HOSPITAL ENCOUNTER (OUTPATIENT)
Dept: INFUSION THERAPY | Age: 53
Discharge: HOME | End: 2023-09-06
Payer: COMMERCIAL

## 2023-09-06 ENCOUNTER — OFFICE VISIT (OUTPATIENT)
Dept: HEMATOLOGY | Age: 53
End: 2023-09-06
Payer: COMMERCIAL

## 2023-09-06 VITALS
HEART RATE: 87 BPM | WEIGHT: 215.5 LBS | BODY MASS INDEX: 29.19 KG/M2 | TEMPERATURE: 98 F | DIASTOLIC BLOOD PRESSURE: 80 MMHG | OXYGEN SATURATION: 97 % | SYSTOLIC BLOOD PRESSURE: 144 MMHG | HEIGHT: 72 IN

## 2023-09-06 DIAGNOSIS — Z79.899 ENCOUNTER FOR MONITORING ADJUVANT HORMONAL THERAPY: ICD-10-CM

## 2023-09-06 DIAGNOSIS — Z17.0 MALIGNANT NEOPLASM OF OVERLAPPING SITES OF RIGHT BREAST IN FEMALE, ESTROGEN RECEPTOR POSITIVE (HCC): ICD-10-CM

## 2023-09-06 DIAGNOSIS — F41.1 ANXIETY ASSOCIATED WITH CANCER DIAGNOSIS (HCC): ICD-10-CM

## 2023-09-06 DIAGNOSIS — Z71.89 CARE PLAN DISCUSSED WITH PATIENT: ICD-10-CM

## 2023-09-06 DIAGNOSIS — C50.811 MALIGNANT NEOPLASM OF OVERLAPPING SITES OF RIGHT BREAST IN FEMALE, ESTROGEN RECEPTOR POSITIVE (HCC): Primary | ICD-10-CM

## 2023-09-06 DIAGNOSIS — Z51.81 ENCOUNTER FOR MONITORING ADJUVANT HORMONAL THERAPY: ICD-10-CM

## 2023-09-06 DIAGNOSIS — R23.2 HOT FLASHES DUE TO TAMOXIFEN: ICD-10-CM

## 2023-09-06 DIAGNOSIS — D50.9 IRON DEFICIENCY ANEMIA, UNSPECIFIED IRON DEFICIENCY ANEMIA TYPE: ICD-10-CM

## 2023-09-06 DIAGNOSIS — Z17.0 MALIGNANT NEOPLASM OF OVERLAPPING SITES OF RIGHT BREAST IN FEMALE, ESTROGEN RECEPTOR POSITIVE (HCC): Primary | ICD-10-CM

## 2023-09-06 DIAGNOSIS — T45.1X5A HOT FLASHES DUE TO TAMOXIFEN: ICD-10-CM

## 2023-09-06 DIAGNOSIS — C50.811 MALIGNANT NEOPLASM OF OVERLAPPING SITES OF RIGHT BREAST IN FEMALE, ESTROGEN RECEPTOR POSITIVE (HCC): ICD-10-CM

## 2023-09-06 DIAGNOSIS — C80.1 ANXIETY ASSOCIATED WITH CANCER DIAGNOSIS (HCC): ICD-10-CM

## 2023-09-06 LAB
ALBUMIN SERPL-MCNC: 4.4 G/DL (ref 3.5–5.2)
ALP SERPL-CCNC: 77 U/L (ref 35–104)
ALT SERPL-CCNC: 17 U/L (ref 5–33)
ANION GAP SERPL CALCULATED.3IONS-SCNC: 10 MMOL/L (ref 7–19)
AST SERPL-CCNC: 14 U/L (ref 5–32)
BASOPHILS # BLD: 0.03 K/UL (ref 0.01–0.08)
BASOPHILS NFR BLD: 0.7 % (ref 0.1–1.2)
BILIRUB SERPL-MCNC: <0.2 MG/DL (ref 0.2–1.2)
BUN SERPL-MCNC: 11 MG/DL (ref 6–20)
CA 15-3: 16 U/ML (ref 0–25)
CALCIUM SERPL-MCNC: 9.6 MG/DL (ref 8.6–10)
CEA SERPL-MCNC: 1.5 NG/ML (ref 0–4.7)
CHLORIDE SERPL-SCNC: 104 MMOL/L (ref 98–111)
CO2 SERPL-SCNC: 26 MMOL/L (ref 22–29)
CREAT SERPL-MCNC: 0.7 MG/DL (ref 0.5–0.9)
EOSINOPHIL # BLD: 0.16 K/UL (ref 0.04–0.54)
EOSINOPHIL NFR BLD: 3.6 % (ref 0.7–7)
ERYTHROCYTE [DISTWIDTH] IN BLOOD BY AUTOMATED COUNT: 12.7 % (ref 11.7–14.4)
GLUCOSE SERPL-MCNC: 101 MG/DL (ref 74–109)
HCT VFR BLD AUTO: 40.1 % (ref 34.1–44.9)
HGB BLD-MCNC: 13.7 G/DL (ref 11.2–15.7)
LYMPHOCYTES # BLD: 1.58 K/UL (ref 1.18–3.74)
LYMPHOCYTES NFR BLD: 35.4 % (ref 19.3–53.1)
MCH RBC QN AUTO: 30.2 PG (ref 25.6–32.2)
MCHC RBC AUTO-ENTMCNC: 34.2 G/DL (ref 32.3–35.5)
MCV RBC AUTO: 88.5 FL (ref 79.4–94.8)
MONOCYTES # BLD: 0.44 K/UL (ref 0.24–0.82)
MONOCYTES NFR BLD: 9.9 % (ref 4.7–12.5)
NEUTROPHILS # BLD: 2.24 K/UL (ref 1.56–6.13)
NEUTS SEG NFR BLD: 50.2 % (ref 34–71.1)
PLATELET # BLD AUTO: 242 K/UL (ref 182–369)
PMV BLD AUTO: 9.6 FL (ref 7.4–10.4)
POTASSIUM SERPL-SCNC: 4.2 MMOL/L (ref 3.5–5)
PROT SERPL-MCNC: 7.2 G/DL (ref 6.6–8.7)
RBC # BLD AUTO: 4.53 M/UL (ref 3.93–5.22)
SODIUM SERPL-SCNC: 140 MMOL/L (ref 136–145)
WBC # BLD AUTO: 4.46 K/UL (ref 3.98–10.04)

## 2023-09-06 PROCEDURE — 36415 COLL VENOUS BLD VENIPUNCTURE: CPT

## 2023-09-06 PROCEDURE — 99212 OFFICE O/P EST SF 10 MIN: CPT

## 2023-09-06 PROCEDURE — 99214 OFFICE O/P EST MOD 30 MIN: CPT | Performed by: NURSE PRACTITIONER

## 2023-09-06 PROCEDURE — 36415 COLL VENOUS BLD VENIPUNCTURE: CPT | Performed by: NURSE PRACTITIONER

## 2023-09-06 PROCEDURE — 85025 COMPLETE CBC W/AUTO DIFF WBC: CPT

## 2023-09-06 RX ORDER — BUSPIRONE HYDROCHLORIDE 10 MG/1
10 TABLET ORAL 3 TIMES DAILY
Qty: 90 TABLET | Refills: 2 | Status: SHIPPED | OUTPATIENT
Start: 2023-09-06 | End: 2023-12-05

## 2023-09-07 ASSESSMENT — ENCOUNTER SYMPTOMS
CONSTIPATION: 0
COUGH: 0
RESPIRATORY NEGATIVE: 1
BLOOD IN STOOL: 0
VOMITING: 0
NAUSEA: 0
SHORTNESS OF BREATH: 0
EYE DISCHARGE: 0
ABDOMINAL PAIN: 0
DIARRHEA: 0
EYE REDNESS: 0
BACK PAIN: 0
GASTROINTESTINAL NEGATIVE: 1
EYES NEGATIVE: 1
WHEEZING: 0
SORE THROAT: 0
EYE PAIN: 0

## 2023-09-08 ENCOUNTER — TELEPHONE (OUTPATIENT)
Dept: SURGERY | Age: 53
End: 2023-09-08

## 2023-09-08 DIAGNOSIS — Z85.3 HISTORY OF BREAST CANCER: Primary | ICD-10-CM

## 2023-09-08 LAB — CANCER AG27-29 SERPL-ACNC: 13.9 U/ML

## 2023-09-08 NOTE — TELEPHONE ENCOUNTER
Discussed mammogram findings. Plan bilateral mammography in June of 2024. This has been electronically signed by Morelia Petty.  Tate Ring PA-C.

## 2023-12-06 DIAGNOSIS — C80.1 ANXIETY ASSOCIATED WITH CANCER DIAGNOSIS (HCC): ICD-10-CM

## 2023-12-06 DIAGNOSIS — Z17.0 MALIGNANT NEOPLASM OF OVERLAPPING SITES OF RIGHT BREAST IN FEMALE, ESTROGEN RECEPTOR POSITIVE (HCC): ICD-10-CM

## 2023-12-06 DIAGNOSIS — C50.811 MALIGNANT NEOPLASM OF OVERLAPPING SITES OF RIGHT BREAST IN FEMALE, ESTROGEN RECEPTOR POSITIVE (HCC): ICD-10-CM

## 2023-12-06 DIAGNOSIS — F41.1 ANXIETY ASSOCIATED WITH CANCER DIAGNOSIS (HCC): ICD-10-CM

## 2023-12-06 RX ORDER — BUSPIRONE HYDROCHLORIDE 10 MG/1
10 TABLET ORAL 3 TIMES DAILY
Qty: 90 TABLET | Refills: 2 | Status: SHIPPED | OUTPATIENT
Start: 2023-12-06

## 2024-01-09 ENCOUNTER — TELEPHONE (OUTPATIENT)
Dept: HEMATOLOGY | Age: 54
End: 2024-01-09

## 2024-01-09 NOTE — TELEPHONE ENCOUNTER
Called patient and reminded patient of their appointment on 1/10/2024and patient confirmed they would be here.

## 2024-01-10 ENCOUNTER — HOSPITAL ENCOUNTER (OUTPATIENT)
Dept: INFUSION THERAPY | Age: 54
Discharge: HOME | End: 2024-01-10
Payer: COMMERCIAL

## 2024-01-10 ENCOUNTER — OFFICE VISIT (OUTPATIENT)
Dept: HEMATOLOGY | Age: 54
End: 2024-01-10
Payer: COMMERCIAL

## 2024-01-10 VITALS
HEIGHT: 72 IN | DIASTOLIC BLOOD PRESSURE: 90 MMHG | TEMPERATURE: 97.8 F | HEART RATE: 98 BPM | SYSTOLIC BLOOD PRESSURE: 140 MMHG | WEIGHT: 211.7 LBS | OXYGEN SATURATION: 98 % | BODY MASS INDEX: 28.68 KG/M2

## 2024-01-10 DIAGNOSIS — C50.811 MALIGNANT NEOPLASM OF OVERLAPPING SITES OF RIGHT BREAST IN FEMALE, ESTROGEN RECEPTOR POSITIVE (HCC): ICD-10-CM

## 2024-01-10 DIAGNOSIS — F41.1 ANXIETY ASSOCIATED WITH CANCER DIAGNOSIS (HCC): ICD-10-CM

## 2024-01-10 DIAGNOSIS — D50.9 IRON DEFICIENCY ANEMIA, UNSPECIFIED IRON DEFICIENCY ANEMIA TYPE: ICD-10-CM

## 2024-01-10 DIAGNOSIS — T45.1X5A HOT FLASHES DUE TO TAMOXIFEN: ICD-10-CM

## 2024-01-10 DIAGNOSIS — C50.811 MALIGNANT NEOPLASM OF OVERLAPPING SITES OF RIGHT BREAST IN FEMALE, ESTROGEN RECEPTOR POSITIVE (HCC): Primary | ICD-10-CM

## 2024-01-10 DIAGNOSIS — R23.2 HOT FLASHES DUE TO TAMOXIFEN: ICD-10-CM

## 2024-01-10 DIAGNOSIS — Z79.899 ENCOUNTER FOR MONITORING ADJUVANT HORMONAL THERAPY: ICD-10-CM

## 2024-01-10 DIAGNOSIS — Z17.0 ER+ PR+ CARCINOMA OF BREAST, RIGHT (HCC): ICD-10-CM

## 2024-01-10 DIAGNOSIS — Z17.0 MALIGNANT NEOPLASM OF OVERLAPPING SITES OF RIGHT BREAST IN FEMALE, ESTROGEN RECEPTOR POSITIVE (HCC): Primary | ICD-10-CM

## 2024-01-10 DIAGNOSIS — C80.1 ANXIETY ASSOCIATED WITH CANCER DIAGNOSIS (HCC): ICD-10-CM

## 2024-01-10 DIAGNOSIS — Z51.81 ENCOUNTER FOR MONITORING ADJUVANT HORMONAL THERAPY: ICD-10-CM

## 2024-01-10 DIAGNOSIS — C50.911 ER+ PR+ CARCINOMA OF BREAST, RIGHT (HCC): ICD-10-CM

## 2024-01-10 DIAGNOSIS — Z17.0 MALIGNANT NEOPLASM OF OVERLAPPING SITES OF RIGHT BREAST IN FEMALE, ESTROGEN RECEPTOR POSITIVE (HCC): ICD-10-CM

## 2024-01-10 LAB
ALBUMIN SERPL-MCNC: 4.4 G/DL (ref 3.5–5.2)
ALP SERPL-CCNC: 94 U/L (ref 35–104)
ALT SERPL-CCNC: 25 U/L (ref 9–52)
ANION GAP SERPL CALCULATED.3IONS-SCNC: 4 MMOL/L (ref 7–19)
AST SERPL-CCNC: 27 U/L (ref 14–36)
BASOPHILS # BLD: 0.04 K/UL (ref 0.01–0.08)
BASOPHILS NFR BLD: 0.8 % (ref 0.1–1.2)
BILIRUB SERPL-MCNC: 0.4 MG/DL (ref 0.2–1.3)
BUN SERPL-MCNC: 10 MG/DL (ref 7–17)
CALCIUM SERPL-MCNC: 9.3 MG/DL (ref 8.4–10.2)
CHLORIDE SERPL-SCNC: 108 MMOL/L (ref 98–111)
CO2 SERPL-SCNC: 27 MMOL/L (ref 22–29)
CREAT SERPL-MCNC: 0.8 MG/DL (ref 0.5–1)
EOSINOPHIL # BLD: 0.19 K/UL (ref 0.04–0.54)
EOSINOPHIL NFR BLD: 3.7 % (ref 0.7–7)
ERYTHROCYTE [DISTWIDTH] IN BLOOD BY AUTOMATED COUNT: 12.6 % (ref 11.7–14.4)
GLOBULIN: 3.2 G/DL
GLUCOSE SERPL-MCNC: 106 MG/DL (ref 74–106)
HCT VFR BLD AUTO: 41.9 % (ref 34.1–44.9)
HGB BLD-MCNC: 14.1 G/DL (ref 11.2–15.7)
LYMPHOCYTES # BLD: 1.86 K/UL (ref 1.18–3.74)
LYMPHOCYTES NFR BLD: 35.9 % (ref 19.3–53.1)
MCH RBC QN AUTO: 30.4 PG (ref 25.6–32.2)
MCHC RBC AUTO-ENTMCNC: 33.7 G/DL (ref 32.3–35.5)
MCV RBC AUTO: 90.3 FL (ref 79.4–94.8)
MONOCYTES # BLD: 0.43 K/UL (ref 0.24–0.82)
MONOCYTES NFR BLD: 8.3 % (ref 4.7–12.5)
NEUTROPHILS # BLD: 2.65 K/UL (ref 1.56–6.13)
NEUTS SEG NFR BLD: 51.1 % (ref 34–71.1)
PLATELET # BLD AUTO: 226 K/UL (ref 182–369)
PMV BLD AUTO: 9.3 FL (ref 7.4–10.4)
POTASSIUM SERPL-SCNC: 4.1 MMOL/L (ref 3.5–5.1)
PROT SERPL-MCNC: 7.5 G/DL (ref 6.3–8.2)
RBC # BLD AUTO: 4.64 M/UL (ref 3.93–5.22)
SODIUM SERPL-SCNC: 139 MMOL/L (ref 137–145)
WBC # BLD AUTO: 5.18 K/UL (ref 3.98–10.04)

## 2024-01-10 PROCEDURE — G8419 CALC BMI OUT NRM PARAM NOF/U: HCPCS | Performed by: NURSE PRACTITIONER

## 2024-01-10 PROCEDURE — G8427 DOCREV CUR MEDS BY ELIG CLIN: HCPCS | Performed by: NURSE PRACTITIONER

## 2024-01-10 PROCEDURE — 85025 COMPLETE CBC W/AUTO DIFF WBC: CPT

## 2024-01-10 PROCEDURE — 1036F TOBACCO NON-USER: CPT | Performed by: NURSE PRACTITIONER

## 2024-01-10 PROCEDURE — 80053 COMPREHEN METABOLIC PANEL: CPT

## 2024-01-10 PROCEDURE — 36415 COLL VENOUS BLD VENIPUNCTURE: CPT

## 2024-01-10 PROCEDURE — 3017F COLORECTAL CA SCREEN DOC REV: CPT | Performed by: NURSE PRACTITIONER

## 2024-01-10 PROCEDURE — 99214 OFFICE O/P EST MOD 30 MIN: CPT | Performed by: NURSE PRACTITIONER

## 2024-01-10 PROCEDURE — G8484 FLU IMMUNIZE NO ADMIN: HCPCS | Performed by: NURSE PRACTITIONER

## 2024-01-10 PROCEDURE — 99212 OFFICE O/P EST SF 10 MIN: CPT

## 2024-01-10 RX ORDER — TAMOXIFEN CITRATE 20 MG/1
20 TABLET ORAL DAILY
Qty: 90 TABLET | Refills: 3 | Status: SHIPPED | OUTPATIENT
Start: 2024-01-10

## 2024-01-10 RX ORDER — VENLAFAXINE HYDROCHLORIDE 37.5 MG/1
37.5 CAPSULE, EXTENDED RELEASE ORAL DAILY
Qty: 30 CAPSULE | Refills: 3 | Status: SHIPPED | OUTPATIENT
Start: 2024-01-10

## 2024-01-10 NOTE — PROGRESS NOTES
Progress Note      Pt Name: Tin Sellers  YOB: 1970  MRN: [id number]    Date of evaluation: 01/10/2024  History Obtained From:  patient, electronic medical record    CHIEF COMPLAINT:    Chief Complaint   Patient presents with    Follow-up     Malignant neoplasm of overlapping sites of right breast in female, estrogen receptor positive (HCC)           HISTORY OF PRESENT ILLNESS:    Tin Sellers is a 53 y.o.  female who is currently being followed for invasive ductal carcinoma of the right breast, ER/NC positive and HER2/loco negative, stage I, diagnosed April 2022.  Current recommendation is for adjuvant endocrine therapy with tamoxifen 20 mg p.o. daily for anticipated 10 years, through May 2032.  Maria D presents today in scheduled follow-up for evaluation, side effect monitoring, lab monitoring and further treatment recommendations.     Today's clinic visit to include physical assessment, review of systems, any lab or radiographic findings that were available and plan of care are documented below.      ONCOLOGIC HISTORY:     Diagnosis  Invasive ductal carcinoma, right breast, April 2022  ER 96%, NC 97%, HER-2 negative, Ki67 3%  MammaPrint-Luminal A/low risk  Stage I  pT1b, (sn)pN0, pMx      Treatment Summary  5/11/22 Right breast lumpectomy with negative SLNB by Dr Jack Pineda  5/11/22 IORT 2000 cGy to right breast   5/12/22 Initiated endocrine hormone therapy with Tamoxifen 20mg daily, anticipate 10-year dosing through May 2032     Cancer History  Maria D Sellers was first seen by Dr Sepulveda on 6/7/2022.  She was referred by Dr. Jack Jeffrey for a diagnosis of stage I breast cancer.  This was an screening detected lesion.  3/29/22 Bilateral screening mammogram ([facility] There is an area of increased density with possible distortion toward 11-12 o'clock right the right breast. Further evaluation is recommended with ultrasound.  4/6/22 US right breast ([facility]): There is an area

## 2024-02-12 ENCOUNTER — TELEPHONE (OUTPATIENT)
Dept: HEMATOLOGY | Age: 54
End: 2024-02-12

## 2024-02-14 ENCOUNTER — HOSPITAL ENCOUNTER (OUTPATIENT)
Dept: INFUSION THERAPY | Age: 54
Discharge: HOME | End: 2024-02-14
Payer: COMMERCIAL

## 2024-02-14 ENCOUNTER — OFFICE VISIT (OUTPATIENT)
Dept: HEMATOLOGY | Age: 54
End: 2024-02-14
Payer: COMMERCIAL

## 2024-02-14 VITALS
TEMPERATURE: 97.9 F | DIASTOLIC BLOOD PRESSURE: 76 MMHG | OXYGEN SATURATION: 98 % | HEIGHT: 72 IN | WEIGHT: 208 LBS | HEART RATE: 106 BPM | BODY MASS INDEX: 28.17 KG/M2 | SYSTOLIC BLOOD PRESSURE: 130 MMHG

## 2024-02-14 DIAGNOSIS — Z51.81 ENCOUNTER FOR MONITORING ADJUVANT HORMONAL THERAPY: ICD-10-CM

## 2024-02-14 DIAGNOSIS — C50.811 MALIGNANT NEOPLASM OF OVERLAPPING SITES OF RIGHT BREAST IN FEMALE, ESTROGEN RECEPTOR POSITIVE (HCC): Primary | ICD-10-CM

## 2024-02-14 DIAGNOSIS — Z17.0 MALIGNANT NEOPLASM OF OVERLAPPING SITES OF RIGHT BREAST IN FEMALE, ESTROGEN RECEPTOR POSITIVE (HCC): ICD-10-CM

## 2024-02-14 DIAGNOSIS — Z17.0 MALIGNANT NEOPLASM OF OVERLAPPING SITES OF RIGHT BREAST IN FEMALE, ESTROGEN RECEPTOR POSITIVE (HCC): Primary | ICD-10-CM

## 2024-02-14 DIAGNOSIS — C50.811 MALIGNANT NEOPLASM OF OVERLAPPING SITES OF RIGHT BREAST IN FEMALE, ESTROGEN RECEPTOR POSITIVE (HCC): ICD-10-CM

## 2024-02-14 DIAGNOSIS — R23.2 HOT FLASHES DUE TO TAMOXIFEN: ICD-10-CM

## 2024-02-14 DIAGNOSIS — Z79.899 ENCOUNTER FOR MONITORING ADJUVANT HORMONAL THERAPY: ICD-10-CM

## 2024-02-14 DIAGNOSIS — T45.1X5A HOT FLASHES DUE TO TAMOXIFEN: ICD-10-CM

## 2024-02-14 DIAGNOSIS — C80.1 ANXIETY ASSOCIATED WITH CANCER DIAGNOSIS (HCC): ICD-10-CM

## 2024-02-14 DIAGNOSIS — F41.1 ANXIETY ASSOCIATED WITH CANCER DIAGNOSIS (HCC): ICD-10-CM

## 2024-02-14 LAB
ALBUMIN SERPL-MCNC: 4.2 G/DL (ref 3.5–5.2)
ALP SERPL-CCNC: 99 U/L (ref 35–104)
ALT SERPL-CCNC: 23 U/L (ref 9–52)
ANION GAP SERPL CALCULATED.3IONS-SCNC: 10 MMOL/L (ref 7–19)
AST SERPL-CCNC: 22 U/L (ref 14–36)
BASOPHILS # BLD: 0.04 K/UL (ref 0.01–0.08)
BASOPHILS NFR BLD: 1 % (ref 0.1–1.2)
BILIRUB SERPL-MCNC: 0.3 MG/DL (ref 0.2–1.3)
BUN SERPL-MCNC: 12 MG/DL (ref 7–17)
CALCIUM SERPL-MCNC: 9.4 MG/DL (ref 8.4–10.2)
CHLORIDE SERPL-SCNC: 107 MMOL/L (ref 98–111)
CO2 SERPL-SCNC: 24 MMOL/L (ref 22–29)
CREAT SERPL-MCNC: 0.8 MG/DL (ref 0.5–1)
EOSINOPHIL # BLD: 0.11 K/UL (ref 0.04–0.54)
EOSINOPHIL NFR BLD: 2.7 % (ref 0.7–7)
ERYTHROCYTE [DISTWIDTH] IN BLOOD BY AUTOMATED COUNT: 12.4 % (ref 11.7–14.4)
GLOBULIN: 3.1 G/DL
GLUCOSE SERPL-MCNC: 94 MG/DL (ref 74–106)
HCT VFR BLD AUTO: 41.2 % (ref 34.1–44.9)
HGB BLD-MCNC: 13.9 G/DL (ref 11.2–15.7)
LYMPHOCYTES # BLD: 1.79 K/UL (ref 1.18–3.74)
LYMPHOCYTES NFR BLD: 43.1 % (ref 19.3–53.1)
MCH RBC QN AUTO: 30 PG (ref 25.6–32.2)
MCHC RBC AUTO-ENTMCNC: 33.7 G/DL (ref 32.3–35.5)
MCV RBC AUTO: 89 FL (ref 79.4–94.8)
MONOCYTES # BLD: 0.45 K/UL (ref 0.24–0.82)
MONOCYTES NFR BLD: 10.8 % (ref 4.7–12.5)
NEUTROPHILS # BLD: 1.75 K/UL (ref 1.56–6.13)
NEUTS SEG NFR BLD: 42.2 % (ref 34–71.1)
PLATELET # BLD AUTO: 204 K/UL (ref 182–369)
PMV BLD AUTO: 9.2 FL (ref 7.4–10.4)
POTASSIUM SERPL-SCNC: 3.7 MMOL/L (ref 3.5–5.1)
PROT SERPL-MCNC: 7.3 G/DL (ref 6.3–8.2)
RBC # BLD AUTO: 4.63 M/UL (ref 3.93–5.22)
SODIUM SERPL-SCNC: 141 MMOL/L (ref 137–145)
WBC # BLD AUTO: 4.15 K/UL (ref 3.98–10.04)

## 2024-02-14 PROCEDURE — G8427 DOCREV CUR MEDS BY ELIG CLIN: HCPCS | Performed by: NURSE PRACTITIONER

## 2024-02-14 PROCEDURE — 36415 COLL VENOUS BLD VENIPUNCTURE: CPT

## 2024-02-14 PROCEDURE — 3017F COLORECTAL CA SCREEN DOC REV: CPT | Performed by: NURSE PRACTITIONER

## 2024-02-14 PROCEDURE — 85025 COMPLETE CBC W/AUTO DIFF WBC: CPT

## 2024-02-14 PROCEDURE — 80053 COMPREHEN METABOLIC PANEL: CPT

## 2024-02-14 PROCEDURE — 99211 OFF/OP EST MAY X REQ PHY/QHP: CPT

## 2024-02-14 PROCEDURE — G8419 CALC BMI OUT NRM PARAM NOF/U: HCPCS | Performed by: NURSE PRACTITIONER

## 2024-02-14 PROCEDURE — G8484 FLU IMMUNIZE NO ADMIN: HCPCS | Performed by: NURSE PRACTITIONER

## 2024-02-14 PROCEDURE — 99214 OFFICE O/P EST MOD 30 MIN: CPT | Performed by: NURSE PRACTITIONER

## 2024-02-14 PROCEDURE — 4004F PT TOBACCO SCREEN RCVD TLK: CPT | Performed by: NURSE PRACTITIONER

## 2024-02-14 NOTE — PROGRESS NOTES
Progress Note      Pt Name: Tin Sellers  YOB: 1970  MRN: [id number]    Date of evaluation: 02/14/2024  History Obtained From:  patient, electronic medical record    CHIEF COMPLAINT:    Chief Complaint   Patient presents with    Follow-up     Malignant neoplasm of overlapping sites of right breast in female, estrogen receptor positive (HCC)       HISTORY OF PRESENT ILLNESS:    Tin Sellers is a 53 y.o.  female who is currently being followed for invasive ductal carcinoma of the right breast, ER/NE positive and HER2/loco negative, stage I, diagnosed April 2022.  Current recommendation is for adjuvant endocrine therapy with tamoxifen 20 mg p.o. daily for anticipated 10 years, through May 2032.  Maria D presents today in scheduled follow-up for evaluation, side effect monitoring, lab monitoring and further treatment recommendations.     Today's clinic visit to include physical assessment, review of systems, any lab or radiographic findings that were available and plan of care are documented below.      ONCOLOGIC HISTORY:     Diagnosis  Invasive ductal carcinoma, right breast, April 2022  ER 96%, NE 97%, HER-2 negative, Ki67 3%  MammaPrint-Luminal A/low risk  Stage I  pT1b, (sn)pN0, pMx      Treatment Summary  5/11/22 Right breast lumpectomy with negative SLNB by Dr Jack Pineda  5/11/22 IORT 2000 cGy to right breast   5/12/22 Initiated endocrine hormone therapy with Tamoxifen 20mg daily, anticipate 10-year dosing through May 2032     Cancer History  Maria D Sellers was first seen by Dr Sepulveda on 6/7/2022.  She was referred by Dr. Jack Jeffrey for a diagnosis of stage I breast cancer.  This was an screening detected lesion.  3/29/22 Bilateral screening mammogram ([facility] [place] [facility]): There is an area of increased density with possible distortion toward 11-12 o'clock right the right breast. Further evaluation is recommended with ultrasound.  4/6/22 US right breast ([facility] [place] [facility]): There is an area of

## 2024-05-03 DIAGNOSIS — F41.1 ANXIETY ASSOCIATED WITH CANCER DIAGNOSIS (HCC): ICD-10-CM

## 2024-05-03 DIAGNOSIS — C80.1 ANXIETY ASSOCIATED WITH CANCER DIAGNOSIS (HCC): ICD-10-CM

## 2024-05-03 RX ORDER — VENLAFAXINE HYDROCHLORIDE 37.5 MG/1
CAPSULE, EXTENDED RELEASE ORAL
Qty: 30 CAPSULE | Refills: 5 | Status: SHIPPED | OUTPATIENT
Start: 2024-05-03

## 2024-05-10 ENCOUNTER — TELEPHONE (OUTPATIENT)
Dept: HEMATOLOGY | Age: 54
End: 2024-05-10

## 2024-05-13 DIAGNOSIS — Z17.0 MALIGNANT NEOPLASM OF OVERLAPPING SITES OF RIGHT BREAST IN FEMALE, ESTROGEN RECEPTOR POSITIVE (HCC): Primary | ICD-10-CM

## 2024-05-13 DIAGNOSIS — C50.811 MALIGNANT NEOPLASM OF OVERLAPPING SITES OF RIGHT BREAST IN FEMALE, ESTROGEN RECEPTOR POSITIVE (HCC): Primary | ICD-10-CM

## 2024-05-13 NOTE — PROGRESS NOTES
Progress Note      Pt Name: Tin Sellers  YOB: 1970  MRN: [id number]    Date of evaluation: 05/14/2024  History Obtained From:  patient, electronic medical record    CHIEF COMPLAINT:    Chief Complaint   Patient presents with    Follow-up     Malignant neoplasm of overlapping sites of right breast in female, estrogen receptor positive (HCC)       HISTORY OF PRESENT ILLNESS:    Tin Sellers is a 53 y.o.  female who is currently being followed for invasive ductal carcinoma of the right breast, ER/NJ positive and HER2/loco negative, stage I, diagnosed April 2022.  Current recommendation is for adjuvant endocrine therapy with tamoxifen 20 mg p.o. daily for anticipated 10 years, through May 2032.  Maria D presents today in scheduled follow-up for evaluation, side effect monitoring, lab monitoring and further treatment recommendations.     Today's clinic visit to include physical assessment, review of systems, any lab or radiographic findings that were available and plan of care are documented below.    ONCOLOGIC HISTORY:     Diagnosis  Invasive ductal carcinoma, right breast, April 2022  ER 96%, NJ 97%, HER-2 negative, Ki67 3%  MammaPrint-Luminal A/low risk  Stage I  pT1b, (sn)pN0, pMx      Treatment Summary  5/11/22 Right breast lumpectomy with negative SLNB by Dr Jack Pineda  5/11/22 IORT 2000 cGy to right breast   5/12/22 Initiated endocrine hormone therapy with Tamoxifen 20mg daily, anticipate 10-year dosing through May 2032     Cancer History  Maria D Sellers was first seen by Dr Sepulveda on 6/7/2022.  She was referred by Dr. Jack Jeffrey for a diagnosis of stage I breast cancer.  This was an screening detected lesion.  3[place]29/22 [place] There is an area of increased density with possible distortion toward 11-12 o'clock right the right breast. Further evaluation is recommended with ultrasound.  4/6/22 [place] right breast [place] There is an area of

## 2024-05-14 ENCOUNTER — HOSPITAL ENCOUNTER (OUTPATIENT)
Dept: INFUSION THERAPY | Age: 54
Discharge: HOME | End: 2024-05-14
Payer: COMMERCIAL

## 2024-05-14 ENCOUNTER — OFFICE VISIT (OUTPATIENT)
Dept: HEMATOLOGY | Age: 54
End: 2024-05-14
Payer: COMMERCIAL

## 2024-05-14 VITALS
WEIGHT: 210 LBS | OXYGEN SATURATION: 98 % | SYSTOLIC BLOOD PRESSURE: 120 MMHG | DIASTOLIC BLOOD PRESSURE: 86 MMHG | HEIGHT: 72 IN | HEART RATE: 88 BPM | BODY MASS INDEX: 28.44 KG/M2 | TEMPERATURE: 98 F

## 2024-05-14 DIAGNOSIS — Z17.0 MALIGNANT NEOPLASM OF OVERLAPPING SITES OF RIGHT BREAST IN FEMALE, ESTROGEN RECEPTOR POSITIVE (HCC): ICD-10-CM

## 2024-05-14 DIAGNOSIS — T45.1X5A HOT FLASHES DUE TO TAMOXIFEN: ICD-10-CM

## 2024-05-14 DIAGNOSIS — C50.811 MALIGNANT NEOPLASM OF OVERLAPPING SITES OF RIGHT BREAST IN FEMALE, ESTROGEN RECEPTOR POSITIVE (HCC): ICD-10-CM

## 2024-05-14 DIAGNOSIS — Z79.899 ENCOUNTER FOR MONITORING ADJUVANT HORMONAL THERAPY: ICD-10-CM

## 2024-05-14 DIAGNOSIS — Z51.81 ENCOUNTER FOR MONITORING ADJUVANT HORMONAL THERAPY: ICD-10-CM

## 2024-05-14 DIAGNOSIS — R23.2 HOT FLASHES DUE TO TAMOXIFEN: ICD-10-CM

## 2024-05-14 DIAGNOSIS — Z71.89 CARE PLAN DISCUSSED WITH PATIENT: ICD-10-CM

## 2024-05-14 DIAGNOSIS — D50.9 IRON DEFICIENCY ANEMIA, UNSPECIFIED IRON DEFICIENCY ANEMIA TYPE: Primary | ICD-10-CM

## 2024-05-14 DIAGNOSIS — C80.1 ANXIETY ASSOCIATED WITH CANCER DIAGNOSIS (HCC): ICD-10-CM

## 2024-05-14 DIAGNOSIS — F41.1 ANXIETY ASSOCIATED WITH CANCER DIAGNOSIS (HCC): ICD-10-CM

## 2024-05-14 LAB
ALBUMIN SERPL-MCNC: 4 G/DL (ref 3.5–5.2)
ALP SERPL-CCNC: 83 U/L (ref 35–104)
ALT SERPL-CCNC: 19 U/L (ref 9–52)
ANION GAP SERPL CALCULATED.3IONS-SCNC: 8 MMOL/L (ref 7–19)
AST SERPL-CCNC: 23 U/L (ref 14–36)
BASOPHILS # BLD: 0.03 K/UL (ref 0.01–0.08)
BASOPHILS NFR BLD: 0.8 % (ref 0.1–1.2)
BILIRUB SERPL-MCNC: 0.4 MG/DL (ref 0.2–1.3)
BUN SERPL-MCNC: 9 MG/DL (ref 7–17)
CALCIUM SERPL-MCNC: 9 MG/DL (ref 8.4–10.2)
CHLORIDE SERPL-SCNC: 104 MMOL/L (ref 98–111)
CO2 SERPL-SCNC: 26 MMOL/L (ref 22–29)
CREAT SERPL-MCNC: 0.9 MG/DL (ref 0.5–1)
EOSINOPHIL # BLD: 0.14 K/UL (ref 0.04–0.54)
EOSINOPHIL NFR BLD: 3.6 % (ref 0.7–7)
ERYTHROCYTE [DISTWIDTH] IN BLOOD BY AUTOMATED COUNT: 13 % (ref 11.7–14.4)
GLOBULIN: 2.6 G/DL
GLUCOSE SERPL-MCNC: 104 MG/DL (ref 74–106)
HCT VFR BLD AUTO: 37.7 % (ref 34.1–44.9)
HGB BLD-MCNC: 12.7 G/DL (ref 11.2–15.7)
LYMPHOCYTES # BLD: 1.42 K/UL (ref 1.18–3.74)
LYMPHOCYTES NFR BLD: 36.8 % (ref 19.3–53.1)
MCH RBC QN AUTO: 30.6 PG (ref 25.6–32.2)
MCHC RBC AUTO-ENTMCNC: 33.7 G/DL (ref 32.3–35.5)
MCV RBC AUTO: 90.8 FL (ref 79.4–94.8)
MONOCYTES # BLD: 0.42 K/UL (ref 0.24–0.82)
MONOCYTES NFR BLD: 10.9 % (ref 4.7–12.5)
NEUTROPHILS # BLD: 1.84 K/UL (ref 1.56–6.13)
NEUTS SEG NFR BLD: 47.6 % (ref 34–71.1)
PLATELET # BLD AUTO: 209 K/UL (ref 182–369)
PMV BLD AUTO: 9.4 FL (ref 7.4–10.4)
POTASSIUM SERPL-SCNC: 3.9 MMOL/L (ref 3.5–5.1)
PROT SERPL-MCNC: 6.6 G/DL (ref 6.3–8.2)
RBC # BLD AUTO: 4.15 M/UL (ref 3.93–5.22)
SODIUM SERPL-SCNC: 138 MMOL/L (ref 137–145)
WBC # BLD AUTO: 3.86 K/UL (ref 3.98–10.04)

## 2024-05-14 PROCEDURE — 3017F COLORECTAL CA SCREEN DOC REV: CPT | Performed by: NURSE PRACTITIONER

## 2024-05-14 PROCEDURE — G8427 DOCREV CUR MEDS BY ELIG CLIN: HCPCS | Performed by: NURSE PRACTITIONER

## 2024-05-14 PROCEDURE — 36415 COLL VENOUS BLD VENIPUNCTURE: CPT

## 2024-05-14 PROCEDURE — 99214 OFFICE O/P EST MOD 30 MIN: CPT | Performed by: NURSE PRACTITIONER

## 2024-05-14 PROCEDURE — G8419 CALC BMI OUT NRM PARAM NOF/U: HCPCS | Performed by: NURSE PRACTITIONER

## 2024-05-14 PROCEDURE — 85025 COMPLETE CBC W/AUTO DIFF WBC: CPT

## 2024-05-14 PROCEDURE — 80053 COMPREHEN METABOLIC PANEL: CPT

## 2024-05-14 PROCEDURE — 99212 OFFICE O/P EST SF 10 MIN: CPT

## 2024-05-14 PROCEDURE — 1036F TOBACCO NON-USER: CPT | Performed by: NURSE PRACTITIONER

## 2024-05-14 RX ORDER — VENLAFAXINE HYDROCHLORIDE 37.5 MG/1
CAPSULE, EXTENDED RELEASE ORAL
Qty: 30 CAPSULE | Refills: 5 | Status: SHIPPED | OUTPATIENT
Start: 2024-05-14

## 2024-05-16 ASSESSMENT — ENCOUNTER SYMPTOMS
VOMITING: 0
BACK PAIN: 0
EYE REDNESS: 0
WHEEZING: 0
EYE PAIN: 0
RESPIRATORY NEGATIVE: 1
NAUSEA: 0
EYE DISCHARGE: 0
EYES NEGATIVE: 1
DIARRHEA: 0
SHORTNESS OF BREATH: 0
ABDOMINAL PAIN: 0
BLOOD IN STOOL: 0
GASTROINTESTINAL NEGATIVE: 1
SORE THROAT: 0

## 2024-07-18 ENCOUNTER — HOSPITAL ENCOUNTER (OUTPATIENT)
Dept: WOMENS IMAGING | Age: 54
Discharge: HOME | End: 2024-07-18
Payer: COMMERCIAL

## 2024-07-18 DIAGNOSIS — Z85.3 HISTORY OF BREAST CANCER: ICD-10-CM

## 2024-07-18 PROCEDURE — G0279 TOMOSYNTHESIS, MAMMO: HCPCS

## 2024-07-22 ENCOUNTER — OFFICE VISIT (OUTPATIENT)
Dept: SURGERY | Age: 54
End: 2024-07-22
Payer: COMMERCIAL

## 2024-07-22 VITALS
HEIGHT: 72 IN | SYSTOLIC BLOOD PRESSURE: 120 MMHG | BODY MASS INDEX: 29.12 KG/M2 | DIASTOLIC BLOOD PRESSURE: 74 MMHG | WEIGHT: 215 LBS

## 2024-07-22 DIAGNOSIS — Z85.3 HISTORY OF BREAST CANCER: Primary | ICD-10-CM

## 2024-07-22 DIAGNOSIS — N60.12 FIBROCYSTIC BREAST CHANGES OF BOTH BREASTS: ICD-10-CM

## 2024-07-22 DIAGNOSIS — N60.11 FIBROCYSTIC BREAST CHANGES OF BOTH BREASTS: ICD-10-CM

## 2024-07-22 PROCEDURE — G8427 DOCREV CUR MEDS BY ELIG CLIN: HCPCS | Performed by: PHYSICIAN ASSISTANT

## 2024-07-22 PROCEDURE — 1036F TOBACCO NON-USER: CPT | Performed by: PHYSICIAN ASSISTANT

## 2024-07-22 PROCEDURE — 3017F COLORECTAL CA SCREEN DOC REV: CPT | Performed by: PHYSICIAN ASSISTANT

## 2024-07-22 PROCEDURE — G8419 CALC BMI OUT NRM PARAM NOF/U: HCPCS | Performed by: PHYSICIAN ASSISTANT

## 2024-07-22 PROCEDURE — 99213 OFFICE O/P EST LOW 20 MIN: CPT | Performed by: PHYSICIAN ASSISTANT

## 2024-07-22 NOTE — PROGRESS NOTES
Tin Sellers comes today for her follow-up breast exam.  She has had no new breast complaints.  She reports no new palpable masses.  There is no skin or nipple changes.  There is no nipple discharge.  She has no appreciable evidence of supraclavicular or axillary adenopathy.    Of note, she had an ultrasound-guided biopsy of the right breast which revealed a 0.5 cm low-grade invasive ductal carcinoma.  ER is positive at 96% and [place] was positive at 97% HER2 was negative.  Ki-67 was low at 3%.  Her MammaPrint showed a low risk luminal type a.  She underwent right breast partial mastectomy with sentinel node biopsy with intraoperative radiation therapy performed by Dr. Pineda on May 12, 2022.        Unilateral mammography done November 23, 2022 showed suspicious calcifications of the right breast at the 10:00 sector.  She underwent stereotactic biopsy with final pathology revealing benign microcalcifications.    Patient Active Problem List    Diagnosis Date Noted    Status post right breast lumpectomy with SNB and IORT on 5/12/2022 06/21/2022    Malignant neoplasm of overlapping sites of right female breast (HCC) 04/18/2022       Current Outpatient Medications   Medication Sig Dispense Refill    venlafaxine (EFFEXOR XR) 37.5 MG extended release capsule TAKE 1 CAPSULE BY MOUTH 1 TIME DAILY 30 capsule 5    tamoxifen (NOLVADEX) 20 MG tablet Take 1 tablet by mouth daily 90 tablet 3     No current facility-administered medications for this visit.       Allergies Monistat [miconazole] and Naproxen    Past Medical History:   Diagnosis Date    Breast cancer (HCC)     Breast lump 05/2022    History of therapeutic radiation        Past Surgical History:   Procedure Laterality Date    BREAST BIOPSY Left 2008    Benign    BREAST LUMPECTOMY Right 5/12/2022    RIGHT LUMPECTOMY AND SN, PREOP US,INTRAOP US GUIDED NL, PEC BLOCK, BIOZORB, FLAPS, MARGIN PROBE, IORT performed by Jack Pineda MD at [facility] OR    [facility]

## 2024-11-08 ENCOUNTER — TELEPHONE (OUTPATIENT)
Dept: HEMATOLOGY | Age: 54
End: 2024-11-08

## 2024-11-08 NOTE — TELEPHONE ENCOUNTER
I called patient and reminded patient of their appt on 11/12/24 and patient confirmed they would be here. I also let patient know that we have moved into our new cancer facility and asked patient if they were aware of where we were now located, and patient voiced understanding of our new location. Patient knows not to arrive early and that we will get labs at the time of the follow up appointment and not the lab appointment time.

## 2024-11-12 ENCOUNTER — OFFICE VISIT (OUTPATIENT)
Dept: HEMATOLOGY | Age: 54
End: 2024-11-12
Payer: COMMERCIAL

## 2024-11-12 ENCOUNTER — HOSPITAL ENCOUNTER (OUTPATIENT)
Dept: INFUSION THERAPY | Age: 54
Discharge: HOME | End: 2024-11-12
Payer: COMMERCIAL

## 2024-11-12 VITALS
BODY MASS INDEX: 29.23 KG/M2 | DIASTOLIC BLOOD PRESSURE: 80 MMHG | WEIGHT: 215.8 LBS | TEMPERATURE: 97.8 F | OXYGEN SATURATION: 98 % | HEIGHT: 72 IN | HEART RATE: 74 BPM | SYSTOLIC BLOOD PRESSURE: 122 MMHG

## 2024-11-12 DIAGNOSIS — Z79.899 ENCOUNTER FOR MONITORING ADJUVANT HORMONAL THERAPY: ICD-10-CM

## 2024-11-12 DIAGNOSIS — T45.1X5A HOT FLASHES DUE TO TAMOXIFEN: ICD-10-CM

## 2024-11-12 DIAGNOSIS — D50.9 IRON DEFICIENCY ANEMIA, UNSPECIFIED IRON DEFICIENCY ANEMIA TYPE: ICD-10-CM

## 2024-11-12 DIAGNOSIS — F41.1 ANXIETY ASSOCIATED WITH CANCER DIAGNOSIS (HCC): ICD-10-CM

## 2024-11-12 DIAGNOSIS — Z17.21 ER+ PR+ CARCINOMA OF BREAST, RIGHT (HCC): ICD-10-CM

## 2024-11-12 DIAGNOSIS — Z71.89 CARE PLAN DISCUSSED WITH PATIENT: ICD-10-CM

## 2024-11-12 DIAGNOSIS — C80.1 ANXIETY ASSOCIATED WITH CANCER DIAGNOSIS (HCC): ICD-10-CM

## 2024-11-12 DIAGNOSIS — Z17.0 MALIGNANT NEOPLASM OF OVERLAPPING SITES OF RIGHT BREAST IN FEMALE, ESTROGEN RECEPTOR POSITIVE (HCC): Primary | ICD-10-CM

## 2024-11-12 DIAGNOSIS — Z17.0 ER+ PR+ CARCINOMA OF BREAST, RIGHT (HCC): ICD-10-CM

## 2024-11-12 DIAGNOSIS — Z51.81 ENCOUNTER FOR MONITORING ADJUVANT HORMONAL THERAPY: ICD-10-CM

## 2024-11-12 DIAGNOSIS — R23.2 HOT FLASHES DUE TO TAMOXIFEN: ICD-10-CM

## 2024-11-12 DIAGNOSIS — C50.911 ER+ PR+ CARCINOMA OF BREAST, RIGHT (HCC): ICD-10-CM

## 2024-11-12 DIAGNOSIS — Z17.0 MALIGNANT NEOPLASM OF OVERLAPPING SITES OF RIGHT BREAST IN FEMALE, ESTROGEN RECEPTOR POSITIVE (HCC): ICD-10-CM

## 2024-11-12 DIAGNOSIS — C50.811 MALIGNANT NEOPLASM OF OVERLAPPING SITES OF RIGHT BREAST IN FEMALE, ESTROGEN RECEPTOR POSITIVE (HCC): Primary | ICD-10-CM

## 2024-11-12 DIAGNOSIS — C50.811 MALIGNANT NEOPLASM OF OVERLAPPING SITES OF RIGHT BREAST IN FEMALE, ESTROGEN RECEPTOR POSITIVE (HCC): ICD-10-CM

## 2024-11-12 LAB
BASOPHILS # BLD: 0.02 K/UL (ref 0.01–0.08)
BASOPHILS NFR BLD: 0.5 % (ref 0.1–1.2)
EOSINOPHIL # BLD: 0.16 K/UL (ref 0.04–0.54)
EOSINOPHIL NFR BLD: 4.1 % (ref 0.7–7)
ERYTHROCYTE [DISTWIDTH] IN BLOOD BY AUTOMATED COUNT: 12.7 % (ref 11.7–14.4)
HCT VFR BLD AUTO: 40.1 % (ref 34.1–44.9)
HGB BLD-MCNC: 13.6 G/DL (ref 11.2–15.7)
LYMPHOCYTES # BLD: 1.59 K/UL (ref 1.18–3.74)
LYMPHOCYTES NFR BLD: 40.9 % (ref 19.3–53.1)
MCH RBC QN AUTO: 31.1 PG (ref 25.6–32.2)
MCHC RBC AUTO-ENTMCNC: 33.9 G/DL (ref 32.3–35.5)
MCV RBC AUTO: 91.6 FL (ref 79.4–94.8)
MONOCYTES # BLD: 0.44 K/UL (ref 0.24–0.82)
MONOCYTES NFR BLD: 11.3 % (ref 4.7–12.5)
NEUTROPHILS # BLD: 1.68 K/UL (ref 1.56–6.13)
NEUTS SEG NFR BLD: 43.2 % (ref 34–71.1)
PLATELET # BLD AUTO: 187 K/UL (ref 182–369)
PMV BLD AUTO: 9.4 FL (ref 7.4–10.4)
RBC # BLD AUTO: 4.38 M/UL (ref 3.93–5.22)
WBC # BLD AUTO: 3.89 K/UL (ref 3.98–10.04)

## 2024-11-12 PROCEDURE — 36415 COLL VENOUS BLD VENIPUNCTURE: CPT

## 2024-11-12 PROCEDURE — 99212 OFFICE O/P EST SF 10 MIN: CPT

## 2024-11-12 PROCEDURE — 85025 COMPLETE CBC W/AUTO DIFF WBC: CPT

## 2024-11-12 RX ORDER — VENLAFAXINE HYDROCHLORIDE 37.5 MG/1
CAPSULE, EXTENDED RELEASE ORAL
Qty: 30 CAPSULE | Refills: 5 | Status: SHIPPED | OUTPATIENT
Start: 2024-11-12 | End: 2024-11-12 | Stop reason: DRUGHIGH

## 2024-11-12 RX ORDER — VENLAFAXINE HYDROCHLORIDE 75 MG/1
75 CAPSULE, EXTENDED RELEASE ORAL DAILY
Qty: 90 CAPSULE | Refills: 1 | Status: SHIPPED | OUTPATIENT
Start: 2024-11-12

## 2024-11-12 RX ORDER — TAMOXIFEN CITRATE 20 MG/1
20 TABLET ORAL DAILY
Qty: 90 TABLET | Refills: 3 | Status: SHIPPED | OUTPATIENT
Start: 2024-11-12

## 2024-11-12 NOTE — PROGRESS NOTES
2027) will consider obtaining breast cancer index study to see if any benefit would be from an additional 5 years of adjuvant endocrine therapy.  -Follow-up with GUMARO Richter as recommended  -Encourage self breast awareness    2.  Side effects of adjuvant endocrine therapy  Hot flashes-reports intermittent and tolerable  Mood swings-intensifying with Effexor 37.5 mg daily, will increase Effexor to 75 mg p.o. daily    Reports having a mild menstrual cycle approximately every 3 months, last in May 2024.  Repeat Pap smear in January 2024 and reported as normal.  -Continue to follow with GYN as recommended    3. Iron deficiency anemia, hemoglobin 13.6, hematocrit 40.1 and MCV 91.6.  Will continue with conservative monitoring    -CBC and CMP today    4. Care plan discussed with patient  Has never had a bone density study, will schedule at follow-up appointment and to occur with when she has her annual mammogram    5.  Survivorship and health maintenance recommendations  Keep a healthy body weight. Dietary recommendations include limit energy intake from total fats and sugars; increase consumption of fruit and vegetables, as well as legumes, whole grains and nuts. Engage in regular physical activity (150 minutes spread through the week).  Other recommendations include minimizing alcohol intake, avoid use of tobacco products.  Practice sun safety: Utilize a sunscreen with an SPF of at least 30.  Avoiding tanning beds.  Ensure adequate amount of sleep.      Follow-up with primary care regularly and for further recommendations regarding age-appropriate screening for cancer, wellbeing visit (preventive measures), follow-up and treatment for other medical comorbidities.      I discussed all of the above findings included in the assessment and plan with the patient and the patient is in agreement to move forward with current recommendations/treatment.  I have addressed all of their questions and concerns that were

## 2024-12-05 ENCOUNTER — TELEPHONE (OUTPATIENT)
Dept: HEMATOLOGY | Age: 54
End: 2024-12-05

## 2024-12-05 DIAGNOSIS — R45.86 MOOD SWINGS: Primary | ICD-10-CM

## 2024-12-05 NOTE — TELEPHONE ENCOUNTER
Called patient regarding side effects she is experiencing from Effexor.  States that she is having significant constipation and stool softeners are not helping.  States that is got worse when Effexor was increased to 75 mg.  States that the hot flashes are about the same and are tolerable.  But would like something besides Effexor to help with the mood swings.  Talked to MATILDE Serna regarding side effects.  Wants patient to stop the Effexor and start on Zoloft 50 mg daily.  Instructed patient on medication, dosage, and side effects.  Instructed to call after she has been on it for two weeks and if not seen any improvement will increase dose.  Instructed to call with any problems.  Patient v/u and is agreeable to plan.

## 2025-05-06 ENCOUNTER — TELEPHONE (OUTPATIENT)
Dept: HEMATOLOGY | Age: 55
End: 2025-05-06

## 2025-05-06 NOTE — TELEPHONE ENCOUNTER
Called pt. to remind them of appointment on 05/12/2025 and had to leave a detailed voicemail with appointment date and time. Reminded patient to just come at appointment time, and to not come at the lab appointment time. Reminded patient that we will not check them in any more than 30 minutes before appointment time. We have now moved to the [facility] that is located between our old office and the ER at the \[facility]". Letting the Pt know that our front entrance faces the "[employer] fields and leaving our address. Reminded pt to eat well and be well hydrated for their labs.

## 2025-05-09 DIAGNOSIS — Z17.0 MALIGNANT NEOPLASM OF OVERLAPPING SITES OF RIGHT BREAST IN FEMALE, ESTROGEN RECEPTOR POSITIVE (HCC): Primary | ICD-10-CM

## 2025-05-09 DIAGNOSIS — E55.9 VITAMIN D DEFICIENCY: ICD-10-CM

## 2025-05-09 DIAGNOSIS — C50.811 MALIGNANT NEOPLASM OF OVERLAPPING SITES OF RIGHT BREAST IN FEMALE, ESTROGEN RECEPTOR POSITIVE (HCC): Primary | ICD-10-CM

## 2025-05-09 NOTE — PROGRESS NOTES
Progress Note      Pt Name: Tin Sellers  YOB: 1970  MRN: [id number]    Date of evaluation: 05/12/2025  History Obtained From:  patient, electronic medical record    CHIEF COMPLAINT:    Chief Complaint   Patient presents with    Breast Cancer       HISTORY OF PRESENT ILLNESS:    Tin Sellers is a 54 y.o.  female who is currently being followed for invasive ductal carcinoma of the right breast, ER 96%, IA 97%, HER-2 negative, Ki67 3%, Mamma Print-Luminal A/low risk, stage I, diagnosed April 2022.  Current recommendation is for adjuvant endocrine therapy with tamoxifen 20 mg p.o. daily for anticipated 10 years, through May 2032.  Maria D presents today in scheduled follow-up for evaluation, side effect monitoring, lab monitoring and further treatment recommendations.     History of Present Illness  She reports no new health concerns since her last visit. She has discontinued the use of Zoloft due to personal preference and reports an improvement in her overall well-being. She experiences manageable hot flashes, predominantly at night. Her current medication regimen includes tamoxifen, which she tolerates well, and a multivitamin supplement. Additionally, she consumes milk as part of her diet. She admits to a decrease in physical activity, specifically walking, compared to her usual routine.     Today's clinic visit to include physical assessment, review of systems, any lab or radiographic findings that were available and plan of care are documented below.    ONCOLOGIC HISTORY:     Diagnosis  Invasive ductal carcinoma, right breast, April 2022  ER 96%, IA 97%, HER-2 negative, Ki67 3%  MammaPrint-Luminal A/low risk  Stage I  pT1b, (sn)pN0, pMx      Treatment Summary  5/11/22 Right breast lumpectomy with negative SLNB by Dr Jack Pineda  5/11/22 IORT 2000 cGy to right breast   5/12/22 Initiated endocrine hormone therapy with Tamoxifen 20mg daily, anticipate 10-year dosing

## 2025-05-12 ENCOUNTER — OFFICE VISIT (OUTPATIENT)
Dept: HEMATOLOGY | Age: 55
End: 2025-05-12
Payer: COMMERCIAL

## 2025-05-12 ENCOUNTER — HOSPITAL ENCOUNTER (OUTPATIENT)
Dept: INFUSION THERAPY | Age: 55
Discharge: HOME | End: 2025-05-12
Payer: COMMERCIAL

## 2025-05-12 VITALS
DIASTOLIC BLOOD PRESSURE: 76 MMHG | RESPIRATION RATE: 12 BRPM | SYSTOLIC BLOOD PRESSURE: 120 MMHG | BODY MASS INDEX: 30.22 KG/M2 | HEIGHT: 72 IN | TEMPERATURE: 98.3 F | WEIGHT: 223.1 LBS | OXYGEN SATURATION: 99 % | HEART RATE: 78 BPM

## 2025-05-12 DIAGNOSIS — Z86.2 HISTORY OF IRON DEFICIENCY ANEMIA: ICD-10-CM

## 2025-05-12 DIAGNOSIS — R45.86 MOOD SWINGS: ICD-10-CM

## 2025-05-12 DIAGNOSIS — Z17.0 MALIGNANT NEOPLASM OF OVERLAPPING SITES OF RIGHT BREAST IN FEMALE, ESTROGEN RECEPTOR POSITIVE (HCC): Primary | ICD-10-CM

## 2025-05-12 DIAGNOSIS — Z79.899 ENCOUNTER FOR MONITORING ADJUVANT HORMONAL THERAPY: ICD-10-CM

## 2025-05-12 DIAGNOSIS — C50.811 MALIGNANT NEOPLASM OF OVERLAPPING SITES OF RIGHT BREAST IN FEMALE, ESTROGEN RECEPTOR POSITIVE (HCC): Primary | ICD-10-CM

## 2025-05-12 DIAGNOSIS — Z78.0 POSTMENOPAUSAL STATE: ICD-10-CM

## 2025-05-12 DIAGNOSIS — Z51.81 ENCOUNTER FOR MONITORING ADJUVANT HORMONAL THERAPY: ICD-10-CM

## 2025-05-12 DIAGNOSIS — E55.9 VITAMIN D DEFICIENCY: ICD-10-CM

## 2025-05-12 LAB
25(OH)D3 SERPL-MCNC: 26.5 NG/ML
ALBUMIN SERPL-MCNC: 4 G/DL (ref 3.5–5.2)
ALP SERPL-CCNC: 94 U/L (ref 35–104)
ALT SERPL-CCNC: 23 U/L (ref 10–35)
ANION GAP SERPL CALCULATED.3IONS-SCNC: 11 MMOL/L (ref 8–16)
AST SERPL-CCNC: 21 U/L (ref 10–35)
BASOPHILS # BLD: 0.02 K/UL (ref 0–0.2)
BASOPHILS NFR BLD: 0.5 % (ref 0–1)
BILIRUB SERPL-MCNC: 0.3 MG/DL (ref 0.2–1.2)
BUN SERPL-MCNC: 13 MG/DL (ref 6–20)
CALCIUM SERPL-MCNC: 9.2 MG/DL (ref 8.6–10)
CHLORIDE SERPL-SCNC: 105 MMOL/L (ref 98–107)
CO2 SERPL-SCNC: 24 MMOL/L (ref 22–29)
CREAT SERPL-MCNC: 0.8 MG/DL (ref 0.5–0.9)
EOSINOPHIL # BLD: 0.21 K/UL (ref 0–0.6)
EOSINOPHIL NFR BLD: 5 % (ref 0–5)
ERYTHROCYTE [DISTWIDTH] IN BLOOD BY AUTOMATED COUNT: 13.2 % (ref 11.5–14.5)
GLUCOSE SERPL-MCNC: 98 MG/DL (ref 70–99)
HCT VFR BLD AUTO: 39 % (ref 37–47)
HGB BLD-MCNC: 13.5 G/DL (ref 12–16)
LYMPHOCYTES # BLD: 1.59 K/UL (ref 1.1–4.5)
LYMPHOCYTES NFR BLD: 38.2 % (ref 20–40)
MCH RBC QN AUTO: 31.7 PG (ref 27–31)
MCHC RBC AUTO-ENTMCNC: 34.6 G/DL (ref 33–37)
MCV RBC AUTO: 91.5 FL (ref 81–99)
MONOCYTES # BLD: 0.55 K/UL (ref 0–0.9)
MONOCYTES NFR BLD: 13.2 % (ref 1–10)
NEUTROPHILS # BLD: 1.78 K/UL (ref 1.5–7.5)
NEUTS SEG NFR BLD: 42.9 % (ref 50–65)
PLATELET # BLD AUTO: 172 K/UL (ref 130–400)
PMV BLD AUTO: 9.3 FL (ref 9.4–12.3)
POTASSIUM SERPL-SCNC: 4.4 MMOL/L (ref 3.5–5.1)
PROT SERPL-MCNC: 6.3 G/DL (ref 6.4–8.3)
RBC # BLD AUTO: 4.26 M/UL (ref 4.2–5.4)
SODIUM SERPL-SCNC: 140 MMOL/L (ref 136–145)
WBC # BLD AUTO: 4.16 K/UL (ref 4.8–10.8)

## 2025-05-12 PROCEDURE — 80053 COMPREHEN METABOLIC PANEL: CPT

## 2025-05-12 PROCEDURE — G2211 COMPLEX E/M VISIT ADD ON: HCPCS | Performed by: NURSE PRACTITIONER

## 2025-05-12 PROCEDURE — 36415 COLL VENOUS BLD VENIPUNCTURE: CPT

## 2025-05-12 PROCEDURE — 99214 OFFICE O/P EST MOD 30 MIN: CPT | Performed by: NURSE PRACTITIONER

## 2025-05-12 PROCEDURE — 36415 COLL VENOUS BLD VENIPUNCTURE: CPT | Performed by: NURSE PRACTITIONER

## 2025-05-12 PROCEDURE — 99213 OFFICE O/P EST LOW 20 MIN: CPT

## 2025-05-12 PROCEDURE — 85025 COMPLETE CBC W/AUTO DIFF WBC: CPT

## 2025-05-12 RX ORDER — M-VIT,TX,IRON,MINS/CALC/FOLIC 27MG-0.4MG
1 TABLET ORAL DAILY
COMMUNITY

## 2025-05-14 ENCOUNTER — RESULTS FOLLOW-UP (OUTPATIENT)
Dept: HEMATOLOGY | Age: 55
End: 2025-05-14

## 2025-05-15 NOTE — TELEPHONE ENCOUNTER
----- Message from Monica DEVI sent at 5/14/2025  8:10 PM CDT -----  Please call and recommend a daily vitamin D 1000 units, to be obtained over-the-counter

## 2025-05-15 NOTE — TELEPHONE ENCOUNTER
Called patient and reviewed lab results, vitamin D level of 26.5.  Instructed that KELSEY Serna would like for her to take vitamin 1000 units daily.  Instructed patient on medication, dosage, frequency, and side effects.  Instructed to call with any problems.  Patient v/u and is agreeable to plan.

## 2025-05-17 ASSESSMENT — ENCOUNTER SYMPTOMS
EYE PAIN: 0
WHEEZING: 0
CONSTIPATION: 0
COUGH: 0
EYE REDNESS: 0
RESPIRATORY NEGATIVE: 1
VOMITING: 0
GASTROINTESTINAL NEGATIVE: 1
BACK PAIN: 0
DIARRHEA: 0
ABDOMINAL PAIN: 0
SHORTNESS OF BREATH: 0
BLOOD IN STOOL: 0
SORE THROAT: 0
EYE DISCHARGE: 0
NAUSEA: 0
EYES NEGATIVE: 1

## 2025-07-22 ENCOUNTER — HOSPITAL ENCOUNTER (OUTPATIENT)
Dept: WOMENS IMAGING | Age: 55
Discharge: HOME | End: 2025-07-22
Payer: COMMERCIAL

## 2025-07-22 ENCOUNTER — OFFICE VISIT (OUTPATIENT)
Dept: SURGERY | Age: 55
End: 2025-07-22
Payer: COMMERCIAL

## 2025-07-22 VITALS — BODY MASS INDEX: 29.39 KG/M2 | OXYGEN SATURATION: 98 % | HEART RATE: 93 BPM | HEIGHT: 72 IN | WEIGHT: 217 LBS

## 2025-07-22 VITALS — WEIGHT: 217 LBS | BODY MASS INDEX: 29.43 KG/M2

## 2025-07-22 DIAGNOSIS — Z78.0 POSTMENOPAUSAL STATE: ICD-10-CM

## 2025-07-22 DIAGNOSIS — Z85.3 HISTORY OF BREAST CANCER: ICD-10-CM

## 2025-07-22 DIAGNOSIS — Z85.3 HISTORY OF BREAST CANCER: Primary | ICD-10-CM

## 2025-07-22 PROCEDURE — 99213 OFFICE O/P EST LOW 20 MIN: CPT | Performed by: PHYSICIAN ASSISTANT

## 2025-07-22 PROCEDURE — 77080 DXA BONE DENSITY AXIAL: CPT

## 2025-07-22 PROCEDURE — 77066 DX MAMMO INCL CAD BI: CPT

## 2025-07-22 NOTE — PROGRESS NOTES
Tin Sellers comes today for her follow-up breast exam.  She has had no new breast complaints.  She reports no new palpable masses.  There is no skin or nipple changes.  There is no nipple discharge.  She has no appreciable evidence of supraclavicular or axillary adenopathy.    Of note, she had an ultrasound-guided biopsy of the right breast which revealed a 0.5 cm low-grade invasive ductal carcinoma.  ER is positive at 96% and SC was positive at 97% HER2 was negative.  Ki-67 was low at 3%.  Her MammaPrint showed a low risk luminal type a.  She underwent right breast partial mastectomy with sentinel node biopsy with intraoperative radiation therapy performed by Dr. Pineda on May 12, 2022.        Unilateral mammography done November 23, 2022 showed suspicious calcifications of the right breast at the 10:00 sector.  She underwent stereotactic biopsy with final pathology revealing benign microcalcifications.    Patient Active Problem List    Diagnosis Date Noted    Status post right breast lumpectomy with SNB and IORT on 5/12/2022 06/21/2022    Malignant neoplasm of overlapping sites of right female breast (HCC) 04/18/2022       Current Outpatient Medications   Medication Sig Dispense Refill    Multiple Vitamins-Minerals (THERAPEUTIC MULTIVITAMIN-MINERALS) tablet Take 1 tablet by mouth daily      tamoxifen (NOLVADEX) 20 MG tablet Take 1 tablet by mouth daily 90 tablet 3     No current facility-administered medications for this visit.       Allergies Monistat [miconazole] and Naproxen    Past Medical History:   Diagnosis Date    Breast cancer (HCC)     Breast lump 05/2022    History of therapeutic radiation        Past Surgical History:   Procedure Laterality Date    BREAST BIOPSY Left 2008    Benign    BREAST LUMPECTOMY Right 5/12/2022    RIGHT LUMPECTOMY AND SN, PREOP US,INTRAOP US GUIDED NL, PEC BLOCK, BIOZORB, FLAPS, MARGIN PROBE, IORT performed by Jack Pineda MD at [facility] OR    [facility] STEREO BREAST BX W LOC

## 2025-07-25 ENCOUNTER — RESULTS FOLLOW-UP (OUTPATIENT)
Dept: HEMATOLOGY | Age: 55
End: 2025-07-25

## 2025-07-25 NOTE — TELEPHONE ENCOUNTER
----- Message from Monica DEVI sent at 7/25/2025  6:28 AM CDT -----  Please call and discuss bone density study indicates osteopenia and encouraged her to take calcium 1200 mg with vitamin D 1000 units daily, along with a walking regimen of at least 30 minutes 5 times a day to help strengthen bones.

## 2025-07-25 NOTE — TELEPHONE ENCOUNTER
Called and reviewed bone density results with patient, now showing osteopenia in left hip.  Instructed that MATILDE Serna would like for her to start calcium with vit d twice a day.  Patient states that she takes vitamin d daily and will get a calcium pill to take with it.  Instructed to call with any problems.  Patient v/u and is agreeable to plan.

## (undated) DEVICE — SUTURE VCRL SZ 3-0 L36IN ABSRB UD L36MM CT-1 1/2 CIR J944H

## (undated) DEVICE — PACK,UNIVERSAL,NO GOWNS: Brand: MEDLINE

## (undated) DEVICE — SYRINGE IRRIG 60ML SFT PLIABLE BLB EZ TO GRP 1 HND USE W/

## (undated) DEVICE — PROVE COVER: Brand: UNBRANDED

## (undated) DEVICE — SUTURE VCRL SZ 3-0 L27IN ABSRB UD L26MM SH 1/2 CIR J416H

## (undated) DEVICE — CLIP INT M L GRN TI TRNSVRS GRV CHEVRON SHP W/ PRECIS TIP

## (undated) DEVICE — GOWN,PREVENTION PLUS,XL,ST,24/CS: Brand: MEDLINE

## (undated) DEVICE — STANDARD HYPODERMIC NEEDLE,POLYPROPYLENE HUB: Brand: MONOJECT

## (undated) DEVICE — SYRINGE MED 30ML STD CLR PLAS LUERLOCK TIP N CTRL DISP

## (undated) DEVICE — JACKSON-PRATT 100CC BULB RESERVOIR: Brand: CARDINAL HEALTH

## (undated) DEVICE — 3M™ IOBAN™ 2 ANTIMICROBIAL INCISE DRAPE 6650EZ: Brand: IOBAN™ 2

## (undated) DEVICE — TOWEL,OR,DSP,ST,BLUE,DLX,4/PK,20PK/CS: Brand: MEDLINE

## (undated) DEVICE — COVER US PRB W5XL96IN LTX W/ GEL

## (undated) DEVICE — 3 ML SYRINGE WITH HYPODERMIC SAFETY NEEDLE: Brand: MAGELLAN

## (undated) DEVICE — CATHETER URETH 24FR 30CC BLLN SILAS F 2 W SPEC M RND TIP

## (undated) DEVICE — SUTURE PERMAHAND SZ 2-0 L18IN NONABSORBABLE BLK L26MM SH C012D

## (undated) DEVICE — SUTURE MNCRYL STRATAFIX PS 4-0 30CM

## (undated) DEVICE — MASK O2 SAMPLING AD M LUER CAPNOXYGEN

## (undated) DEVICE — MAJOR CDS

## (undated) DEVICE — KIT APPLICATOR BALLOON POUCH ST 4-5 CM

## (undated) DEVICE — SPECIMEN ORIENTATION CHARMS, SIX DISTINCTLY SHAPED STERILE 10MM CHARMS: Brand: MARGINMAP

## (undated) DEVICE — SYSTEM SKIN CLSR 22CM DERMBND PRINEO

## (undated) DEVICE — GOWN,PREVENTION PLUS,2XL,ST,22/CS: Brand: MEDLINE

## (undated) DEVICE — SYRINGE MED 10ML LUERLOCK TIP W/O SFTY DISP

## (undated) DEVICE — DRAIN JACKSON PRATT ROUND 15FR: Brand: CARDINAL HEALTH

## (undated) DEVICE — DRESSING TRNSPAR W5XL4.5IN FLM SHT SEMIPERMEABLE WIND

## (undated) DEVICE — SUTURE VCRL SZ 2-0 L36IN ABSRB UD L36MM CT-1 1/2 CIR J945H

## (undated) DEVICE — Device

## (undated) DEVICE — GAUZE,SPONGE,FLUFF,6"X6.75",STRL,10/TRAY: Brand: MEDLINE

## (undated) DEVICE — SUTURE PROL SZ 2-0 L30IN NONABSORBABLE BLU L26MM CT-2 1/2 8411H

## (undated) DEVICE — GLOVE SURG SZ 75 CRM LTX FREE POLYISOPRENE POLYMER BEAD ANTI

## (undated) DEVICE — PEN: MARKING STD 100/CS: Brand: MEDICAL ACTION INDUSTRIES

## (undated) DEVICE — PAD,NON-ADHERENT,3X8,STERILE,LF,1/PK: Brand: MEDLINE

## (undated) DEVICE — SOLUTION IV IRRIG WATER 1000ML POUR BRL 2F7114

## (undated) DEVICE — SUTURE ETHLN SZ 2-0 L30IN NONABSORBABLE BLK L36MM FSLX 3/8 1674H

## (undated) DEVICE — SHEET,DRAPE,53X77,STERILE: Brand: MEDLINE

## (undated) DEVICE — PROBE DTECT MARGIN F/LUMPECTOMY

## (undated) DEVICE — DRESSING GRMCDL 6 12FR D1N CNTR HOLE 4MM ANTMCRBL PRTCTVE DI